# Patient Record
Sex: FEMALE | Race: ASIAN | NOT HISPANIC OR LATINO | URBAN - METROPOLITAN AREA
[De-identification: names, ages, dates, MRNs, and addresses within clinical notes are randomized per-mention and may not be internally consistent; named-entity substitution may affect disease eponyms.]

---

## 2023-10-11 ENCOUNTER — EMERGENCY (EMERGENCY)
Facility: HOSPITAL | Age: 68
LOS: 0 days | Discharge: HOME | End: 2023-10-11
Attending: EMERGENCY MEDICINE
Payer: COMMERCIAL

## 2023-10-11 VITALS
RESPIRATION RATE: 18 BRPM | WEIGHT: 156.09 LBS | DIASTOLIC BLOOD PRESSURE: 87 MMHG | SYSTOLIC BLOOD PRESSURE: 164 MMHG | HEART RATE: 60 BPM | OXYGEN SATURATION: 96 % | TEMPERATURE: 98 F

## 2023-10-11 DIAGNOSIS — R42 DIZZINESS AND GIDDINESS: ICD-10-CM

## 2023-10-11 DIAGNOSIS — S09.90XA UNSPECIFIED INJURY OF HEAD, INITIAL ENCOUNTER: ICD-10-CM

## 2023-10-11 DIAGNOSIS — I10 ESSENTIAL (PRIMARY) HYPERTENSION: ICD-10-CM
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
## 2023-10-11 DIAGNOSIS — R51.9 HEADACHE, UNSPECIFIED: ICD-10-CM

## 2023-10-11 PROCEDURE — 70450 CT HEAD/BRAIN W/O DYE: CPT | Mod: MA

## 2023-10-11 PROCEDURE — 99284 EMERGENCY DEPT VISIT MOD MDM: CPT | Mod: 25

## 2023-10-11 PROCEDURE — 70450 CT HEAD/BRAIN W/O DYE: CPT | Mod: 26,MA

## 2023-10-11 PROCEDURE — 99284 EMERGENCY DEPT VISIT MOD MDM: CPT

## 2023-10-11 RX ORDER — ACETAMINOPHEN 500 MG
650 TABLET ORAL ONCE
Refills: 0 | Status: COMPLETED | OUTPATIENT
Start: 2023-10-11 | End: 2023-10-11

## 2023-10-11 RX ADMIN — Medication 650 MILLIGRAM(S): at 20:18
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
## 2023-10-11 NOTE — ED PROVIDER NOTE - NS ED ATTENDING STATEMENT MOD
This was a shared visit with the COLBY. I reviewed and verified the documentation and independently performed the documented:
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
## 2023-10-11 NOTE — ED PROVIDER NOTE - NSFOLLOWUPCLINICS_GEN_ALL_ED_FT
[facility] Concussion Program  Concussion Program  [address] [place] [address]   Phone: [phone]  Fax:

## 2023-10-11 NOTE — ED ADULT NURSE NOTE - NSFALLUNIVINTERV_ED_ALL_ED
Bed/Stretcher in lowest position, wheels locked, appropriate side rails in place/Call bell, personal items and telephone in reach/Instruct patient to call for assistance before getting out of bed/chair/stretcher/Non-slip footwear applied when patient is off stretcher/[place] to call system/Physically safe environment - no spills, clutter or unnecessary equipment/Purposeful proactive rounding/Room/bathroom lighting operational, light cord in reach

## 2023-10-11 NOTE — ED PROVIDER NOTE - PHYSICAL EXAMINATION
CONSTITUTIONAL: Well-appearing; well-nourished; in no apparent distress.   EYES: PERRL; EOM intact.   ENT: normal nose; no rhinorrhea; normal pharynx with no tonsillar hypertrophy.   NECK: Supple; non-tender; no cervical lymphadenopathy.   CARDIOVASCULAR: Normal S1, S2; no murmurs, rubs, or gallops. Equal radial pulses  RESPIRATORY: Normal chest excursion with respiration; breath sounds clear and equal bilaterally; no wheezes, rhonchi, or rales.  GI/: Normal bowel sounds; non-distended; non-tender; no palpable organomegaly.   MS: No evidence of trauma or deformity. Normal ROM in all four extremities; non-tender to palpation; distal pulses are normal.   SKIN: Normal for age and race; warm; dry; good turgor; no apparent lesions or exudate.   NEURO/PSYCH: A & O x 4; grossly unremarkable. mood and manner are appropriate No focal deficits.  No facial droop.  No tongue deviation.  Cerebellar intact.  Normal gait.  Sensation intact.

## 2023-10-11 NOTE — ED PROVIDER NOTE - PATIENT PORTAL LINK FT
You can access the FollowMyHealth Patient Portal offered by [facility] by registering at the following website: [email or web address]. By joining [employer]’s FollowMyHealth portal, you will also be able to view your health information using other applications (apps) compatible with our system.
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
## 2023-11-08 ENCOUNTER — INPATIENT (INPATIENT)
Facility: HOSPITAL | Age: 68
LOS: 2 days | Discharge: HOME | DRG: 244 | End: 2023-11-11
Attending: INTERNAL MEDICINE | Admitting: STUDENT IN AN ORGANIZED HEALTH CARE EDUCATION/TRAINING PROGRAM
Payer: MEDICARE

## 2023-11-08 VITALS
DIASTOLIC BLOOD PRESSURE: 59 MMHG | OXYGEN SATURATION: 96 % | HEART RATE: 62 BPM | SYSTOLIC BLOOD PRESSURE: 118 MMHG | RESPIRATION RATE: 16 BRPM

## 2023-11-08 DIAGNOSIS — I95.9 HYPOTENSION, UNSPECIFIED: ICD-10-CM

## 2023-11-08 DIAGNOSIS — I49.5 SICK SINUS SYNDROME: ICD-10-CM

## 2023-11-08 DIAGNOSIS — I10 ESSENTIAL (PRIMARY) HYPERTENSION: ICD-10-CM

## 2023-11-08 DIAGNOSIS — K59.00 CONSTIPATION, UNSPECIFIED: ICD-10-CM

## 2023-11-08 DIAGNOSIS — E78.5 HYPERLIPIDEMIA, UNSPECIFIED: ICD-10-CM

## 2023-11-08 DIAGNOSIS — Z79.899 OTHER LONG TERM (CURRENT) DRUG THERAPY: ICD-10-CM

## 2023-11-08 DIAGNOSIS — I44.1 ATRIOVENTRICULAR BLOCK, SECOND DEGREE: ICD-10-CM

## 2023-11-08 DIAGNOSIS — R00.1 BRADYCARDIA, UNSPECIFIED: ICD-10-CM

## 2023-11-08 DIAGNOSIS — I49.8 OTHER SPECIFIED CARDIAC ARRHYTHMIAS: ICD-10-CM

## 2023-11-08 LAB
ALBUMIN SERPL ELPH-MCNC: 4.5 G/DL — SIGNIFICANT CHANGE UP (ref 3.5–5.2)
ALBUMIN SERPL ELPH-MCNC: 4.5 G/DL — SIGNIFICANT CHANGE UP (ref 3.5–5.2)
ALP SERPL-CCNC: 82 U/L — SIGNIFICANT CHANGE UP (ref 30–115)
ALP SERPL-CCNC: 82 U/L — SIGNIFICANT CHANGE UP (ref 30–115)
ALT FLD-CCNC: 27 U/L — SIGNIFICANT CHANGE UP (ref 0–41)
ALT FLD-CCNC: 27 U/L — SIGNIFICANT CHANGE UP (ref 0–41)
ANION GAP SERPL CALC-SCNC: 10 MMOL/L — SIGNIFICANT CHANGE UP (ref 7–14)
ANION GAP SERPL CALC-SCNC: 10 MMOL/L — SIGNIFICANT CHANGE UP (ref 7–14)
AST SERPL-CCNC: 33 U/L — SIGNIFICANT CHANGE UP (ref 0–41)
AST SERPL-CCNC: 33 U/L — SIGNIFICANT CHANGE UP (ref 0–41)
BASOPHILS # BLD AUTO: 0.07 K/UL — SIGNIFICANT CHANGE UP (ref 0–0.2)
BASOPHILS # BLD AUTO: 0.07 K/UL — SIGNIFICANT CHANGE UP (ref 0–0.2)
BASOPHILS NFR BLD AUTO: 0.9 % — SIGNIFICANT CHANGE UP (ref 0–1)
BASOPHILS NFR BLD AUTO: 0.9 % — SIGNIFICANT CHANGE UP (ref 0–1)
BILIRUB SERPL-MCNC: 0.6 MG/DL — SIGNIFICANT CHANGE UP (ref 0.2–1.2)
BILIRUB SERPL-MCNC: 0.6 MG/DL — SIGNIFICANT CHANGE UP (ref 0.2–1.2)
BUN SERPL-MCNC: 25 MG/DL — HIGH (ref 10–20)
BUN SERPL-MCNC: 25 MG/DL — HIGH (ref 10–20)
CALCIUM SERPL-MCNC: 9.8 MG/DL — SIGNIFICANT CHANGE UP (ref 8.4–10.5)
CALCIUM SERPL-MCNC: 9.8 MG/DL — SIGNIFICANT CHANGE UP (ref 8.4–10.5)
CHLORIDE SERPL-SCNC: 100 MMOL/L — SIGNIFICANT CHANGE UP (ref 98–110)
CHLORIDE SERPL-SCNC: 100 MMOL/L — SIGNIFICANT CHANGE UP (ref 98–110)
CO2 SERPL-SCNC: 29 MMOL/L — SIGNIFICANT CHANGE UP (ref 17–32)
CO2 SERPL-SCNC: 29 MMOL/L — SIGNIFICANT CHANGE UP (ref 17–32)
CREAT SERPL-MCNC: 0.8 MG/DL — SIGNIFICANT CHANGE UP (ref 0.7–1.5)
CREAT SERPL-MCNC: 0.8 MG/DL — SIGNIFICANT CHANGE UP (ref 0.7–1.5)
D DIMER BLD IA.RAPID-MCNC: 480 NG/ML DDU — HIGH
D DIMER BLD IA.RAPID-MCNC: 480 NG/ML DDU — HIGH
EGFR: 80 ML/MIN/1.73M2 — SIGNIFICANT CHANGE UP
EGFR: 80 ML/MIN/1.73M2 — SIGNIFICANT CHANGE UP
EOSINOPHIL # BLD AUTO: 0.15 K/UL — SIGNIFICANT CHANGE UP (ref 0–0.7)
EOSINOPHIL # BLD AUTO: 0.15 K/UL — SIGNIFICANT CHANGE UP (ref 0–0.7)
EOSINOPHIL NFR BLD AUTO: 1.9 % — SIGNIFICANT CHANGE UP (ref 0–8)
EOSINOPHIL NFR BLD AUTO: 1.9 % — SIGNIFICANT CHANGE UP (ref 0–8)
GLUCOSE SERPL-MCNC: 113 MG/DL — HIGH (ref 70–99)
GLUCOSE SERPL-MCNC: 113 MG/DL — HIGH (ref 70–99)
HCT VFR BLD CALC: 39.9 % — SIGNIFICANT CHANGE UP (ref 37–47)
HCT VFR BLD CALC: 39.9 % — SIGNIFICANT CHANGE UP (ref 37–47)
HGB BLD-MCNC: 13.3 G/DL — SIGNIFICANT CHANGE UP (ref 12–16)
HGB BLD-MCNC: 13.3 G/DL — SIGNIFICANT CHANGE UP (ref 12–16)
IMM GRANULOCYTES NFR BLD AUTO: 0.5 % — HIGH (ref 0.1–0.3)
IMM GRANULOCYTES NFR BLD AUTO: 0.5 % — HIGH (ref 0.1–0.3)
LYMPHOCYTES # BLD AUTO: 1.31 K/UL — SIGNIFICANT CHANGE UP (ref 1.2–3.4)
LYMPHOCYTES # BLD AUTO: 1.31 K/UL — SIGNIFICANT CHANGE UP (ref 1.2–3.4)
LYMPHOCYTES # BLD AUTO: 16.5 % — LOW (ref 20.5–51.1)
LYMPHOCYTES # BLD AUTO: 16.5 % — LOW (ref 20.5–51.1)
MAGNESIUM SERPL-MCNC: 2.4 MG/DL — SIGNIFICANT CHANGE UP (ref 1.8–2.4)
MAGNESIUM SERPL-MCNC: 2.4 MG/DL — SIGNIFICANT CHANGE UP (ref 1.8–2.4)
MCHC RBC-ENTMCNC: 30.6 PG — SIGNIFICANT CHANGE UP (ref 27–31)
MCHC RBC-ENTMCNC: 30.6 PG — SIGNIFICANT CHANGE UP (ref 27–31)
MCHC RBC-ENTMCNC: 33.3 G/DL — SIGNIFICANT CHANGE UP (ref 32–37)
MCHC RBC-ENTMCNC: 33.3 G/DL — SIGNIFICANT CHANGE UP (ref 32–37)
MCV RBC AUTO: 91.7 FL — SIGNIFICANT CHANGE UP (ref 81–99)
MCV RBC AUTO: 91.7 FL — SIGNIFICANT CHANGE UP (ref 81–99)
MONOCYTES # BLD AUTO: 0.53 K/UL — SIGNIFICANT CHANGE UP (ref 0.1–0.6)
MONOCYTES # BLD AUTO: 0.53 K/UL — SIGNIFICANT CHANGE UP (ref 0.1–0.6)
MONOCYTES NFR BLD AUTO: 6.7 % — SIGNIFICANT CHANGE UP (ref 1.7–9.3)
MONOCYTES NFR BLD AUTO: 6.7 % — SIGNIFICANT CHANGE UP (ref 1.7–9.3)
NEUTROPHILS # BLD AUTO: 5.83 K/UL — SIGNIFICANT CHANGE UP (ref 1.4–6.5)
NEUTROPHILS # BLD AUTO: 5.83 K/UL — SIGNIFICANT CHANGE UP (ref 1.4–6.5)
NEUTROPHILS NFR BLD AUTO: 73.5 % — SIGNIFICANT CHANGE UP (ref 42.2–75.2)
NEUTROPHILS NFR BLD AUTO: 73.5 % — SIGNIFICANT CHANGE UP (ref 42.2–75.2)
NRBC # BLD: 0 /100 WBCS — SIGNIFICANT CHANGE UP (ref 0–0)
NRBC # BLD: 0 /100 WBCS — SIGNIFICANT CHANGE UP (ref 0–0)
NT-PROBNP SERPL-SCNC: 64 PG/ML — SIGNIFICANT CHANGE UP (ref 0–300)
NT-PROBNP SERPL-SCNC: 64 PG/ML — SIGNIFICANT CHANGE UP (ref 0–300)
PLATELET # BLD AUTO: 189 K/UL — SIGNIFICANT CHANGE UP (ref 130–400)
PLATELET # BLD AUTO: 189 K/UL — SIGNIFICANT CHANGE UP (ref 130–400)
PMV BLD: 11.2 FL — HIGH (ref 7.4–10.4)
PMV BLD: 11.2 FL — HIGH (ref 7.4–10.4)
POTASSIUM SERPL-MCNC: 4.1 MMOL/L — SIGNIFICANT CHANGE UP (ref 3.5–5)
POTASSIUM SERPL-MCNC: 4.1 MMOL/L — SIGNIFICANT CHANGE UP (ref 3.5–5)
POTASSIUM SERPL-SCNC: 4.1 MMOL/L — SIGNIFICANT CHANGE UP (ref 3.5–5)
POTASSIUM SERPL-SCNC: 4.1 MMOL/L — SIGNIFICANT CHANGE UP (ref 3.5–5)
PROT SERPL-MCNC: 7.6 G/DL — SIGNIFICANT CHANGE UP (ref 6–8)
PROT SERPL-MCNC: 7.6 G/DL — SIGNIFICANT CHANGE UP (ref 6–8)
RBC # BLD: 4.35 M/UL — SIGNIFICANT CHANGE UP (ref 4.2–5.4)
RBC # BLD: 4.35 M/UL — SIGNIFICANT CHANGE UP (ref 4.2–5.4)
RBC # FLD: 12.3 % — SIGNIFICANT CHANGE UP (ref 11.5–14.5)
RBC # FLD: 12.3 % — SIGNIFICANT CHANGE UP (ref 11.5–14.5)
SODIUM SERPL-SCNC: 139 MMOL/L — SIGNIFICANT CHANGE UP (ref 135–146)
SODIUM SERPL-SCNC: 139 MMOL/L — SIGNIFICANT CHANGE UP (ref 135–146)
TROPONIN T SERPL-MCNC: <0.01 NG/ML — SIGNIFICANT CHANGE UP
TROPONIN T SERPL-MCNC: <0.01 NG/ML — SIGNIFICANT CHANGE UP
WBC # BLD: 7.93 K/UL — SIGNIFICANT CHANGE UP (ref 4.8–10.8)
WBC # BLD: 7.93 K/UL — SIGNIFICANT CHANGE UP (ref 4.8–10.8)
WBC # FLD AUTO: 7.93 K/UL — SIGNIFICANT CHANGE UP (ref 4.8–10.8)
WBC # FLD AUTO: 7.93 K/UL — SIGNIFICANT CHANGE UP (ref 4.8–10.8)

## 2023-11-08 PROCEDURE — 99285 EMERGENCY DEPT VISIT HI MDM: CPT

## 2023-11-08 PROCEDURE — 84443 ASSAY THYROID STIM HORMONE: CPT

## 2023-11-08 PROCEDURE — 71046 X-RAY EXAM CHEST 2 VIEWS: CPT

## 2023-11-08 PROCEDURE — 74018 RADEX ABDOMEN 1 VIEW: CPT

## 2023-11-08 PROCEDURE — 86803 HEPATITIS C AB TEST: CPT

## 2023-11-08 PROCEDURE — 86618 LYME DISEASE ANTIBODY: CPT

## 2023-11-08 PROCEDURE — 83605 ASSAY OF LACTIC ACID: CPT

## 2023-11-08 PROCEDURE — 80053 COMPREHEN METABOLIC PANEL: CPT

## 2023-11-08 PROCEDURE — 83036 HEMOGLOBIN GLYCOSYLATED A1C: CPT

## 2023-11-08 PROCEDURE — 86901 BLOOD TYPING SEROLOGIC RH(D): CPT

## 2023-11-08 PROCEDURE — 86850 RBC ANTIBODY SCREEN: CPT

## 2023-11-08 PROCEDURE — 36415 COLL VENOUS BLD VENIPUNCTURE: CPT

## 2023-11-08 PROCEDURE — 93005 ELECTROCARDIOGRAM TRACING: CPT

## 2023-11-08 PROCEDURE — 85025 COMPLETE CBC W/AUTO DIFF WBC: CPT

## 2023-11-08 PROCEDURE — 85730 THROMBOPLASTIN TIME PARTIAL: CPT

## 2023-11-08 PROCEDURE — 71045 X-RAY EXAM CHEST 1 VIEW: CPT

## 2023-11-08 PROCEDURE — 85027 COMPLETE CBC AUTOMATED: CPT

## 2023-11-08 PROCEDURE — 80061 LIPID PANEL: CPT

## 2023-11-08 PROCEDURE — 71275 CT ANGIOGRAPHY CHEST: CPT | Mod: MA

## 2023-11-08 PROCEDURE — 71045 X-RAY EXAM CHEST 1 VIEW: CPT | Mod: 26

## 2023-11-08 PROCEDURE — 86900 BLOOD TYPING SEROLOGIC ABO: CPT

## 2023-11-08 PROCEDURE — 93280 PM DEVICE PROGR EVAL DUAL: CPT

## 2023-11-08 PROCEDURE — C1785: CPT

## 2023-11-08 PROCEDURE — 93306 TTE W/DOPPLER COMPLETE: CPT

## 2023-11-08 PROCEDURE — 85610 PROTHROMBIN TIME: CPT

## 2023-11-08 PROCEDURE — 84100 ASSAY OF PHOSPHORUS: CPT

## 2023-11-08 PROCEDURE — 83735 ASSAY OF MAGNESIUM: CPT

## 2023-11-08 PROCEDURE — C1898: CPT

## 2023-11-08 RX ORDER — SODIUM CHLORIDE 9 MG/ML
1000 INJECTION INTRAMUSCULAR; INTRAVENOUS; SUBCUTANEOUS ONCE
Refills: 0 | Status: COMPLETED | OUTPATIENT
Start: 2023-11-08 | End: 2023-11-08

## 2023-11-08 RX ORDER — ONDANSETRON 8 MG/1
4 TABLET, FILM COATED ORAL ONCE
Refills: 0 | Status: COMPLETED | OUTPATIENT
Start: 2023-11-08 | End: 2023-11-08

## 2023-11-08 RX ADMIN — SODIUM CHLORIDE 1000 MILLILITER(S): 9 INJECTION INTRAMUSCULAR; INTRAVENOUS; SUBCUTANEOUS at 21:52

## 2023-11-08 RX ADMIN — ONDANSETRON 4 MILLIGRAM(S): 8 TABLET, FILM COATED ORAL at 21:51

## 2023-11-08 NOTE — ED PROVIDER NOTE - CLINICAL SUMMARY MEDICAL DECISION MAKING FREE TEXT BOX
69 yo f with no known medical history BIBEMS due to chest pain, nausea and vomiting, per EMS report upon arrival found pt has HR:36, given 1mg atropin HR improved to 60s but remained in chest pain, brought in here. here pt still endorses chest pain and nausea, denies sob, cough, fever chills. denies taking any medications here HR is 60s  labs negative trop, d-dimer elevated but per years criteria negative, 2 times ecg nsr.  due to unexplained symptomatic bradycardia pt admitted to medicine telemetric bed.

## 2023-11-08 NOTE — ED PROVIDER NOTE - PHYSICAL EXAMINATION
VITAL SIGNS: I have reviewed nursing notes and confirm.  CONSTITUTIONAL: non-toxic, well appearing  SKIN: no rash, no petechiae.  EYES: PERRL, pink conjunctiva, anicteric  ENT: tongue midline, no exudates, MMM  NECK: Supple; no meningismus  CARD: RRR, no murmurs, equal radial pulses bilaterally 2+  RESP: CTAB, no respiratory distress  ABD: Soft, non-tender  EXT: Normal ROM x4. No edema.   NEURO: Alert, oriented, no focal deficits

## 2023-11-08 NOTE — ED PROVIDER NOTE - ATTENDING CONTRIBUTION TO CARE
I have personally performed a history and physical exam on this patient and personally directed the management of the patient.  69 yo f with no known medical history BIBEMS due to chest pain, nausea and vomitting, per EMS report uppon arrival found pt has HR:36, given 1mg atropin HR improved to 60s but remained in chest pain, bourght in here. here pt still endorses chest pain and nausea, denies sob, cough, fever chills. denies taking any meditions. here HR is 60s  CON:  no acute distress, HENMT: normocephalic, atraumatic, anicteric, no conjunctival injection,  CV: regular rhythm, distal pulses intact, RESP: no acute respiratory distress, no stridor, breathing comfortably on RA , GI:  soft, nontender, no rebound, no guarding, SKIN: no wounds MSK: no deformities, NEURO: no gross motor or sensory deficit Psychiatric: appropriate mood, appropriate affect  palaced on cardiac monitoring and pacer pads  will send cardiac labs, cxr, ecg and reevaluate

## 2023-11-08 NOTE — ED ADULT TRIAGE NOTE - CHIEF COMPLAINT QUOTE
Pt BIBA from home, family called for chest pain with 1x vomiting  EMS endorses bradycardia to 36 on their arrival - 1mg atropine admin enroute with improvement of HR to 60s

## 2023-11-08 NOTE — ED PROVIDER NOTE - OBJECTIVE STATEMENT
68-year-old female with past medical history of hypertension hyperlipidemia presents to the ED with chest pain and shortness of breath.  Per family patient was doing well earlier today and at baseline, ate dinner, and shortly afterwards developed chest pain associated with nausea and 2 episodes of nb/nb vomiting. Family became concerned and EMS was called.  EMS reports patient was bradycardic approximately 38 bpm with hypotension 80s/50s.  EMS administered atropine 1 mg with improvement in heart rate to 60s bpm, and was given IV fluids en route with improvement in blood pressure as well.  Denies recent fevers illness chills shortness of breath cough abdominal pain or lower extremity edema.

## 2023-11-09 LAB
A1C WITH ESTIMATED AVERAGE GLUCOSE RESULT: 5.3 % — SIGNIFICANT CHANGE UP (ref 4–5.6)
A1C WITH ESTIMATED AVERAGE GLUCOSE RESULT: 5.3 % — SIGNIFICANT CHANGE UP (ref 4–5.6)
ALBUMIN SERPL ELPH-MCNC: 4.1 G/DL — SIGNIFICANT CHANGE UP (ref 3.5–5.2)
ALBUMIN SERPL ELPH-MCNC: 4.1 G/DL — SIGNIFICANT CHANGE UP (ref 3.5–5.2)
ALP SERPL-CCNC: 76 U/L — SIGNIFICANT CHANGE UP (ref 30–115)
ALP SERPL-CCNC: 76 U/L — SIGNIFICANT CHANGE UP (ref 30–115)
ALT FLD-CCNC: 22 U/L — SIGNIFICANT CHANGE UP (ref 0–41)
ALT FLD-CCNC: 22 U/L — SIGNIFICANT CHANGE UP (ref 0–41)
ANION GAP SERPL CALC-SCNC: 8 MMOL/L — SIGNIFICANT CHANGE UP (ref 7–14)
ANION GAP SERPL CALC-SCNC: 8 MMOL/L — SIGNIFICANT CHANGE UP (ref 7–14)
APTT BLD: 28.1 SEC — SIGNIFICANT CHANGE UP (ref 27–39.2)
APTT BLD: 28.1 SEC — SIGNIFICANT CHANGE UP (ref 27–39.2)
AST SERPL-CCNC: 22 U/L — SIGNIFICANT CHANGE UP (ref 0–41)
AST SERPL-CCNC: 22 U/L — SIGNIFICANT CHANGE UP (ref 0–41)
BASOPHILS # BLD AUTO: 0.06 K/UL — SIGNIFICANT CHANGE UP (ref 0–0.2)
BASOPHILS # BLD AUTO: 0.06 K/UL — SIGNIFICANT CHANGE UP (ref 0–0.2)
BASOPHILS NFR BLD AUTO: 1 % — SIGNIFICANT CHANGE UP (ref 0–1)
BASOPHILS NFR BLD AUTO: 1 % — SIGNIFICANT CHANGE UP (ref 0–1)
BILIRUB SERPL-MCNC: 0.5 MG/DL — SIGNIFICANT CHANGE UP (ref 0.2–1.2)
BILIRUB SERPL-MCNC: 0.5 MG/DL — SIGNIFICANT CHANGE UP (ref 0.2–1.2)
BLD GP AB SCN SERPL QL: SIGNIFICANT CHANGE UP
BLD GP AB SCN SERPL QL: SIGNIFICANT CHANGE UP
BUN SERPL-MCNC: 19 MG/DL — SIGNIFICANT CHANGE UP (ref 10–20)
BUN SERPL-MCNC: 19 MG/DL — SIGNIFICANT CHANGE UP (ref 10–20)
CALCIUM SERPL-MCNC: 9.2 MG/DL — SIGNIFICANT CHANGE UP (ref 8.4–10.5)
CALCIUM SERPL-MCNC: 9.2 MG/DL — SIGNIFICANT CHANGE UP (ref 8.4–10.5)
CHLORIDE SERPL-SCNC: 107 MMOL/L — SIGNIFICANT CHANGE UP (ref 98–110)
CHLORIDE SERPL-SCNC: 107 MMOL/L — SIGNIFICANT CHANGE UP (ref 98–110)
CHOLEST SERPL-MCNC: 254 MG/DL — HIGH
CHOLEST SERPL-MCNC: 254 MG/DL — HIGH
CO2 SERPL-SCNC: 27 MMOL/L — SIGNIFICANT CHANGE UP (ref 17–32)
CO2 SERPL-SCNC: 27 MMOL/L — SIGNIFICANT CHANGE UP (ref 17–32)
CREAT SERPL-MCNC: 0.7 MG/DL — SIGNIFICANT CHANGE UP (ref 0.7–1.5)
CREAT SERPL-MCNC: 0.7 MG/DL — SIGNIFICANT CHANGE UP (ref 0.7–1.5)
EGFR: 94 ML/MIN/1.73M2 — SIGNIFICANT CHANGE UP
EGFR: 94 ML/MIN/1.73M2 — SIGNIFICANT CHANGE UP
EOSINOPHIL # BLD AUTO: 0.14 K/UL — SIGNIFICANT CHANGE UP (ref 0–0.7)
EOSINOPHIL # BLD AUTO: 0.14 K/UL — SIGNIFICANT CHANGE UP (ref 0–0.7)
EOSINOPHIL NFR BLD AUTO: 2.4 % — SIGNIFICANT CHANGE UP (ref 0–8)
EOSINOPHIL NFR BLD AUTO: 2.4 % — SIGNIFICANT CHANGE UP (ref 0–8)
ESTIMATED AVERAGE GLUCOSE: 105 MG/DL — SIGNIFICANT CHANGE UP (ref 68–114)
ESTIMATED AVERAGE GLUCOSE: 105 MG/DL — SIGNIFICANT CHANGE UP (ref 68–114)
GLUCOSE SERPL-MCNC: 116 MG/DL — HIGH (ref 70–99)
GLUCOSE SERPL-MCNC: 116 MG/DL — HIGH (ref 70–99)
HCT VFR BLD CALC: 37.3 % — SIGNIFICANT CHANGE UP (ref 37–47)
HCT VFR BLD CALC: 37.3 % — SIGNIFICANT CHANGE UP (ref 37–47)
HCV AB S/CO SERPL IA: 0.06 COI — SIGNIFICANT CHANGE UP
HCV AB S/CO SERPL IA: 0.06 COI — SIGNIFICANT CHANGE UP
HCV AB SERPL-IMP: SIGNIFICANT CHANGE UP
HCV AB SERPL-IMP: SIGNIFICANT CHANGE UP
HDLC SERPL-MCNC: 63 MG/DL — SIGNIFICANT CHANGE UP
HDLC SERPL-MCNC: 63 MG/DL — SIGNIFICANT CHANGE UP
HGB BLD-MCNC: 12.5 G/DL — SIGNIFICANT CHANGE UP (ref 12–16)
HGB BLD-MCNC: 12.5 G/DL — SIGNIFICANT CHANGE UP (ref 12–16)
IMM GRANULOCYTES NFR BLD AUTO: 0.3 % — SIGNIFICANT CHANGE UP (ref 0.1–0.3)
IMM GRANULOCYTES NFR BLD AUTO: 0.3 % — SIGNIFICANT CHANGE UP (ref 0.1–0.3)
INR BLD: 0.98 RATIO — SIGNIFICANT CHANGE UP (ref 0.65–1.3)
INR BLD: 0.98 RATIO — SIGNIFICANT CHANGE UP (ref 0.65–1.3)
LIPID PNL WITH DIRECT LDL SERPL: 172 MG/DL — HIGH
LIPID PNL WITH DIRECT LDL SERPL: 172 MG/DL — HIGH
LYMPHOCYTES # BLD AUTO: 1.45 K/UL — SIGNIFICANT CHANGE UP (ref 1.2–3.4)
LYMPHOCYTES # BLD AUTO: 1.45 K/UL — SIGNIFICANT CHANGE UP (ref 1.2–3.4)
LYMPHOCYTES # BLD AUTO: 24.6 % — SIGNIFICANT CHANGE UP (ref 20.5–51.1)
LYMPHOCYTES # BLD AUTO: 24.6 % — SIGNIFICANT CHANGE UP (ref 20.5–51.1)
MAGNESIUM SERPL-MCNC: 2.2 MG/DL — SIGNIFICANT CHANGE UP (ref 1.8–2.4)
MAGNESIUM SERPL-MCNC: 2.2 MG/DL — SIGNIFICANT CHANGE UP (ref 1.8–2.4)
MCHC RBC-ENTMCNC: 30.9 PG — SIGNIFICANT CHANGE UP (ref 27–31)
MCHC RBC-ENTMCNC: 30.9 PG — SIGNIFICANT CHANGE UP (ref 27–31)
MCHC RBC-ENTMCNC: 33.5 G/DL — SIGNIFICANT CHANGE UP (ref 32–37)
MCHC RBC-ENTMCNC: 33.5 G/DL — SIGNIFICANT CHANGE UP (ref 32–37)
MCV RBC AUTO: 92.3 FL — SIGNIFICANT CHANGE UP (ref 81–99)
MCV RBC AUTO: 92.3 FL — SIGNIFICANT CHANGE UP (ref 81–99)
MONOCYTES # BLD AUTO: 0.49 K/UL — SIGNIFICANT CHANGE UP (ref 0.1–0.6)
MONOCYTES # BLD AUTO: 0.49 K/UL — SIGNIFICANT CHANGE UP (ref 0.1–0.6)
MONOCYTES NFR BLD AUTO: 8.3 % — SIGNIFICANT CHANGE UP (ref 1.7–9.3)
MONOCYTES NFR BLD AUTO: 8.3 % — SIGNIFICANT CHANGE UP (ref 1.7–9.3)
NEUTROPHILS # BLD AUTO: 3.74 K/UL — SIGNIFICANT CHANGE UP (ref 1.4–6.5)
NEUTROPHILS # BLD AUTO: 3.74 K/UL — SIGNIFICANT CHANGE UP (ref 1.4–6.5)
NEUTROPHILS NFR BLD AUTO: 63.4 % — SIGNIFICANT CHANGE UP (ref 42.2–75.2)
NEUTROPHILS NFR BLD AUTO: 63.4 % — SIGNIFICANT CHANGE UP (ref 42.2–75.2)
NON HDL CHOLESTEROL: 191 MG/DL — HIGH
NON HDL CHOLESTEROL: 191 MG/DL — HIGH
NRBC # BLD: 0 /100 WBCS — SIGNIFICANT CHANGE UP (ref 0–0)
NRBC # BLD: 0 /100 WBCS — SIGNIFICANT CHANGE UP (ref 0–0)
PHOSPHATE SERPL-MCNC: 3.6 MG/DL — SIGNIFICANT CHANGE UP (ref 2.1–4.9)
PHOSPHATE SERPL-MCNC: 3.6 MG/DL — SIGNIFICANT CHANGE UP (ref 2.1–4.9)
PLATELET # BLD AUTO: 170 K/UL — SIGNIFICANT CHANGE UP (ref 130–400)
PLATELET # BLD AUTO: 170 K/UL — SIGNIFICANT CHANGE UP (ref 130–400)
PMV BLD: 11.4 FL — HIGH (ref 7.4–10.4)
PMV BLD: 11.4 FL — HIGH (ref 7.4–10.4)
POTASSIUM SERPL-MCNC: 4.1 MMOL/L — SIGNIFICANT CHANGE UP (ref 3.5–5)
POTASSIUM SERPL-MCNC: 4.1 MMOL/L — SIGNIFICANT CHANGE UP (ref 3.5–5)
POTASSIUM SERPL-SCNC: 4.1 MMOL/L — SIGNIFICANT CHANGE UP (ref 3.5–5)
POTASSIUM SERPL-SCNC: 4.1 MMOL/L — SIGNIFICANT CHANGE UP (ref 3.5–5)
PROT SERPL-MCNC: 6.8 G/DL — SIGNIFICANT CHANGE UP (ref 6–8)
PROT SERPL-MCNC: 6.8 G/DL — SIGNIFICANT CHANGE UP (ref 6–8)
PROTHROM AB SERPL-ACNC: 11.2 SEC — SIGNIFICANT CHANGE UP (ref 9.95–12.87)
PROTHROM AB SERPL-ACNC: 11.2 SEC — SIGNIFICANT CHANGE UP (ref 9.95–12.87)
RBC # BLD: 4.04 M/UL — LOW (ref 4.2–5.4)
RBC # BLD: 4.04 M/UL — LOW (ref 4.2–5.4)
RBC # FLD: 12.6 % — SIGNIFICANT CHANGE UP (ref 11.5–14.5)
RBC # FLD: 12.6 % — SIGNIFICANT CHANGE UP (ref 11.5–14.5)
SODIUM SERPL-SCNC: 142 MMOL/L — SIGNIFICANT CHANGE UP (ref 135–146)
SODIUM SERPL-SCNC: 142 MMOL/L — SIGNIFICANT CHANGE UP (ref 135–146)
T4 FREE+ TSH PNL SERPL: 1.33 UIU/ML — SIGNIFICANT CHANGE UP (ref 0.27–4.2)
T4 FREE+ TSH PNL SERPL: 1.33 UIU/ML — SIGNIFICANT CHANGE UP (ref 0.27–4.2)
TRIGL SERPL-MCNC: 95 MG/DL — SIGNIFICANT CHANGE UP
TRIGL SERPL-MCNC: 95 MG/DL — SIGNIFICANT CHANGE UP
WBC # BLD: 5.9 K/UL — SIGNIFICANT CHANGE UP (ref 4.8–10.8)
WBC # BLD: 5.9 K/UL — SIGNIFICANT CHANGE UP (ref 4.8–10.8)
WBC # FLD AUTO: 5.9 K/UL — SIGNIFICANT CHANGE UP (ref 4.8–10.8)
WBC # FLD AUTO: 5.9 K/UL — SIGNIFICANT CHANGE UP (ref 4.8–10.8)

## 2023-11-09 PROCEDURE — 99291 CRITICAL CARE FIRST HOUR: CPT

## 2023-11-09 PROCEDURE — 71275 CT ANGIOGRAPHY CHEST: CPT | Mod: 26

## 2023-11-09 PROCEDURE — 93306 TTE W/DOPPLER COMPLETE: CPT | Mod: 26

## 2023-11-09 PROCEDURE — 99222 1ST HOSP IP/OBS MODERATE 55: CPT

## 2023-11-09 PROCEDURE — 74018 RADEX ABDOMEN 1 VIEW: CPT | Mod: 26

## 2023-11-09 PROCEDURE — 99223 1ST HOSP IP/OBS HIGH 75: CPT | Mod: 57

## 2023-11-09 PROCEDURE — 93010 ELECTROCARDIOGRAM REPORT: CPT

## 2023-11-09 PROCEDURE — 93010 ELECTROCARDIOGRAM REPORT: CPT | Mod: 77

## 2023-11-09 RX ORDER — SENNA PLUS 8.6 MG/1
1 TABLET ORAL ONCE
Refills: 0 | Status: COMPLETED | OUTPATIENT
Start: 2023-11-09 | End: 2023-11-09

## 2023-11-09 RX ORDER — ATROPINE SULFATE 0.1 MG/ML
1 SYRINGE (ML) INJECTION ONCE
Refills: 0 | Status: DISCONTINUED | OUTPATIENT
Start: 2023-11-09 | End: 2023-11-09

## 2023-11-09 RX ORDER — LANOLIN ALCOHOL/MO/W.PET/CERES
3 CREAM (GRAM) TOPICAL AT BEDTIME
Refills: 0 | Status: DISCONTINUED | OUTPATIENT
Start: 2023-11-09 | End: 2023-11-10

## 2023-11-09 RX ORDER — DOPAMINE HYDROCHLORIDE 40 MG/ML
0.5 INJECTION, SOLUTION, CONCENTRATE INTRAVENOUS
Qty: 400 | Refills: 0 | Status: DISCONTINUED | OUTPATIENT
Start: 2023-11-09 | End: 2023-11-09

## 2023-11-09 RX ORDER — ENOXAPARIN SODIUM 100 MG/ML
40 INJECTION SUBCUTANEOUS EVERY 24 HOURS
Refills: 0 | Status: DISCONTINUED | OUTPATIENT
Start: 2023-11-09 | End: 2023-11-09

## 2023-11-09 RX ORDER — ATORVASTATIN CALCIUM 80 MG/1
20 TABLET, FILM COATED ORAL AT BEDTIME
Refills: 0 | Status: DISCONTINUED | OUTPATIENT
Start: 2023-11-09 | End: 2023-11-10

## 2023-11-09 RX ORDER — ACETAMINOPHEN 500 MG
650 TABLET ORAL EVERY 6 HOURS
Refills: 0 | Status: DISCONTINUED | OUTPATIENT
Start: 2023-11-09 | End: 2023-11-10

## 2023-11-09 RX ORDER — CHLORHEXIDINE GLUCONATE 213 G/1000ML
1 SOLUTION TOPICAL
Refills: 0 | Status: DISCONTINUED | OUTPATIENT
Start: 2023-11-09 | End: 2023-11-10

## 2023-11-09 RX ORDER — DOPAMINE HYDROCHLORIDE 40 MG/ML
10 INJECTION, SOLUTION, CONCENTRATE INTRAVENOUS
Qty: 400 | Refills: 0 | Status: DISCONTINUED | OUTPATIENT
Start: 2023-11-09 | End: 2023-11-10

## 2023-11-09 RX ORDER — ATROPINE SULFATE 0.1 MG/ML
0.5 SYRINGE (ML) INJECTION ONCE
Refills: 0 | Status: COMPLETED | OUTPATIENT
Start: 2023-11-09 | End: 2023-11-09

## 2023-11-09 RX ORDER — ONDANSETRON 8 MG/1
4 TABLET, FILM COATED ORAL EVERY 8 HOURS
Refills: 0 | Status: DISCONTINUED | OUTPATIENT
Start: 2023-11-09 | End: 2023-11-10

## 2023-11-09 RX ORDER — LOSARTAN/HYDROCHLOROTHIAZIDE 100MG-25MG
1 TABLET ORAL
Refills: 0 | DISCHARGE

## 2023-11-09 RX ORDER — POLYETHYLENE GLYCOL 3350 17 G/17G
17 POWDER, FOR SOLUTION ORAL ONCE
Refills: 0 | Status: COMPLETED | OUTPATIENT
Start: 2023-11-09 | End: 2023-11-09

## 2023-11-09 RX ORDER — AMLODIPINE BESYLATE 2.5 MG/1
1 TABLET ORAL
Refills: 0 | DISCHARGE

## 2023-11-09 RX ORDER — POLYETHYLENE GLYCOL 3350 17 G/17G
17 POWDER, FOR SOLUTION ORAL
Refills: 0 | Status: DISCONTINUED | OUTPATIENT
Start: 2023-11-10 | End: 2023-11-10

## 2023-11-09 RX ADMIN — DOPAMINE HYDROCHLORIDE 26.4 MICROGRAM(S)/KG/MIN: 40 INJECTION, SOLUTION, CONCENTRATE INTRAVENOUS at 12:11

## 2023-11-09 RX ADMIN — DOPAMINE HYDROCHLORIDE 1.33 MICROGRAM(S)/KG/MIN: 40 INJECTION, SOLUTION, CONCENTRATE INTRAVENOUS at 07:35

## 2023-11-09 RX ADMIN — SENNA PLUS 1 TABLET(S): 8.6 TABLET ORAL at 17:30

## 2023-11-09 RX ADMIN — DOPAMINE HYDROCHLORIDE 1.33 MICROGRAM(S)/KG/MIN: 40 INJECTION, SOLUTION, CONCENTRATE INTRAVENOUS at 07:21

## 2023-11-09 RX ADMIN — ATORVASTATIN CALCIUM 20 MILLIGRAM(S): 80 TABLET, FILM COATED ORAL at 21:21

## 2023-11-09 RX ADMIN — Medication 0.5 MILLIGRAM(S): at 12:11

## 2023-11-09 RX ADMIN — DOPAMINE HYDROCHLORIDE 26.4 MICROGRAM(S)/KG/MIN: 40 INJECTION, SOLUTION, CONCENTRATE INTRAVENOUS at 17:28

## 2023-11-09 RX ADMIN — ONDANSETRON 4 MILLIGRAM(S): 8 TABLET, FILM COATED ORAL at 07:59

## 2023-11-09 NOTE — CONSULT NOTE ADULT - SUBJECTIVE AND OBJECTIVE BOX
Outpt cardiologist: None    HPI:   68-year-old female with past medical history of hypertension hyperlipidemia presents to the ED with chest pain and shortness of breath.  Per family patient was doing well earlier today and at baseline, ate dinner, and shortly afterwards developed chest pain associated with nausea and 2 episodes of nb/nb vomiting. Family became concerned and EMS was called.  EMS reports patient was bradycardic approximately 38 bpm with hypotension 80s/50s.  EMS administered atropine 1 mg with improvement in heart rate to 60s bpm, and was given IV fluids en route with improvement in blood pressure as well. Per the son the patient had a similar episode few weeks ago but didn't seek medical care   Denies recent fevers illness chills shortness of breath cough abdominal pain or lower extremity edema.    In the ED , BP and HR 60 with ECG showing NSR  Labs unremarkable, D-dimer 480 , Trop 0.01  CXR with no consolidations    Patient admitted for bradycardic episode   (2023 02:02)      PAST MEDICAL & SURGICAL HISTORY  Hypertension  Hyperlipidemia        FAMILY HISTORY:  FAMILY HISTORY:      SOCIAL HISTORY:  Social History: neg x3      ALLERGIES:  No Known Allergies      MEDICATIONS:  atropine Syringe 1 milliGRAM(s) IV Push once  DOPamine Infusion 0.5 MICROgram(s)/kG/Min (1.33 mL/Hr) IV Continuous <Continuous>  enoxaparin Injectable 40 milliGRAM(s) SubCutaneous every 24 hours    PRN:  acetaminophen     Tablet .. 650 milliGRAM(s) Oral every 6 hours PRN  aluminum hydroxide/magnesium hydroxide/simethicone Suspension 30 milliLiter(s) Oral every 4 hours PRN  melatonin 3 milliGRAM(s) Oral at bedtime PRN  ondansetron Injectable 4 milliGRAM(s) IV Push every 8 hours PRN      HOME MEDICATIONS:  Home Medications:      VITALS:   T(F): 96.5 ( @ 23:45), Max: 96.5 ( @ 23:45)  HR: 36 ( @ 06:38) (36 - 62)  BP: 98/55 ( @ 06:38) (95/56 - 118/59)  BP(mean): --  RR: 17 ( @ 02:29) (16 - 17)  SpO2: 96% ( @ 02:29) (96% - 96%)    I&O's Summary      REVIEW OF SYSTEMS:  CONSTITUTIONAL: No weakness, fevers or chills  HEENT: No visual changes, neck/ear pain  RESPIRATORY: No cough, sob  CARDIOVASCULAR: See HPI  GASTROINTESTINAL: No abdominal pain. No nausea, vomiting, diarrhea   GENITOURINARY: No dysuria, frequency or hematuria  NEUROLOGICAL: No new focal deficits  SKIN: No new rashes    PHYSICAL EXAM:  General: Not in distress.  Non-toxic appearing.   HEENT: EOMI  Cardio: regular, S1, S2, no murmur, bradycardia   Pulm: B/L BS.  No wheezing / crackles / rales  Abdomen: Soft, non-tender, non-distended. Normoactive bowel sounds  Extremities: No edema b/l le  Neuro: A&O x3. No focal deficits    LABS:                        13.3   7.93  )-----------( 189      ( 2023 22:10 )             39.9         139  |  100  |  25<H>  ----------------------------<  113<H>  4.1   |  29  |  0.8    Ca    9.8      2023 22:10  Mg     2.4         TPro  7.6  /  Alb  4.5  /  TBili  0.6  /  DBili  x   /  AST  33  /  ALT  27  /  AlkPhos  82        Troponin T, Serum: <0.01 ng/mL (23 @ 22:10)    CARDIAC MARKERS ( 2023 22:10 )  x     / <0.01 ng/mL / x     / x     / x          RADIOLOGY:  CT Angio Chest PE Protocol w/ IV Cont (23): No evidence of acute intrathoracic pathology or pulmonary embolism.      EC Lead ECG:   Ventricular Rate 64 BPM    Atrial Rate 64 BPM    P-R Interval 160 ms    QRS Duration 94 ms    Q-T Interval 434 ms    QTC Calculation(Bazett) 447 ms    P Axis 35 degrees    R Axis 54 degrees    T Axis 49 degrees    Diagnosis Line Normal sinus rhythm  Normal ECG    Confirmed by SARA JARRETT, [facility] ([id number]) on 2023 10:37:12 PM ( @ 21:01) Outpt cardiologist: None    HPI:   68-year-old female with past medical history of hypertension hyperlipidemia presents to the ED with chest pain and shortness of breath.  Per family patient was doing well earlier today and at baseline, ate dinner, and shortly afterwards developed chest pain associated with nausea and 2 episodes of nb/nb vomiting. Family became concerned and EMS was called.  EMS reports patient was bradycardic approximately 38 bpm with hypotension 80s/50s.  EMS administered atropine 1 mg with improvement in heart rate to 60s bpm, and was given IV fluids en route with improvement in blood pressure as well. Per the son the patient had a similar episode few weeks ago but didn't seek medical care   Denies recent fevers illness chills shortness of breath cough abdominal pain or lower extremity edema.    In the ED , BP and HR 60 with ECG showing NSR  Labs unremarkable, D-dimer 480 , Trop 0.01  CXR with no consolidations    Patient admitted for bradycardic episode   (2023 02:02)      PAST MEDICAL & SURGICAL HISTORY  Hypertension  Hyperlipidemia        FAMILY HISTORY: no premature history of SCD      SOCIAL HISTORY:  Social History: neg x3      ALLERGIES:  No Known Allergies      MEDICATIONS:  atropine Syringe 1 milliGRAM(s) IV Push once  DOPamine Infusion 0.5 MICROgram(s)/kG/Min (1.33 mL/Hr) IV Continuous <Continuous>  enoxaparin Injectable 40 milliGRAM(s) SubCutaneous every 24 hours    PRN:  acetaminophen     Tablet .. 650 milliGRAM(s) Oral every 6 hours PRN  aluminum hydroxide/magnesium hydroxide/simethicone Suspension 30 milliLiter(s) Oral every 4 hours PRN  melatonin 3 milliGRAM(s) Oral at bedtime PRN  ondansetron Injectable 4 milliGRAM(s) IV Push every 8 hours PRN      HOME MEDICATIONS:  Home Medications:      VITALS:   T(F): 96.5 ( @ 23:45), Max: 96.5 ( @ 23:45)  HR: 36 ( @ 06:38) (36 - 62)  BP: 98/55 ( @ 06:38) (95/56 - 118/59)  BP(mean): --  RR: 17 ( @ 02:29) (16 - 17)  SpO2: 96% ( @ 02:29) (96% - 96%)    I&O's Summary      REVIEW OF SYSTEMS:  CONSTITUTIONAL: No weakness, fevers or chills  HEENT: No visual changes, neck/ear pain  RESPIRATORY: No cough, sob  CARDIOVASCULAR: See HPI  GASTROINTESTINAL: No abdominal pain. No nausea, vomiting, diarrhea   GENITOURINARY: No dysuria, frequency or hematuria  NEUROLOGICAL: No new focal deficits  SKIN: No new rashes    PHYSICAL EXAM:  General: Not in distress.  Non-toxic appearing.   HEENT: EOMI  Cardio: regular, S1, S2, no murmur, bradycardia   Pulm: B/L BS.  No wheezing / crackles / rales  Abdomen: Soft, non-tender, non-distended. Normoactive bowel sounds  Extremities: No edema b/l le  Neuro: A&O x3. No focal deficits    LABS:                        13.3   7.93  )-----------( 189      ( 2023 22:10 )             39.9         139  |  100  |  25<H>  ----------------------------<  113<H>  4.1   |  29  |  0.8    Ca    9.8      2023 22:10  Mg     2.4         TPro  7.6  /  Alb  4.5  /  TBili  0.6  /  DBili  x   /  AST  33  /  ALT  27  /  AlkPhos  82        Troponin T, Serum: <0.01 ng/mL (23 @ 22:10)    CARDIAC MARKERS ( 2023 22:10 )  x     / <0.01 ng/mL / x     / x     / x          RADIOLOGY:  CT Angio Chest PE Protocol w/ IV Cont (23): No evidence of acute intrathoracic pathology or pulmonary embolism.      EC Lead ECG:   Ventricular Rate 64 BPM    Atrial Rate 64 BPM    P-R Interval 160 ms    QRS Duration 94 ms    Q-T Interval 434 ms    QTC Calculation(Bazett) 447 ms    P Axis 35 degrees    R Axis 54 degrees    T Axis 49 degrees    Diagnosis Line Normal sinus rhythm  Normal ECG    Confirmed by SARA JARRETT, [facility] ([id number]) on 2023 10:37:12 PM ( @ 21:01)

## 2023-11-09 NOTE — ED ADULT NURSE NOTE - NSFALLHARMRISKINTERV_ED_ALL_ED

## 2023-11-09 NOTE — CONSULT NOTE ADULT - SUBJECTIVE AND OBJECTIVE BOX
Outpt cardiologist:    HPI:   68-year-old female with past medical history of hypertension hyperlipidemia presents to the ED with chest pain and shortness of breath.  Per family patient was doing well earlier today and at baseline, ate dinner, and shortly afterwards developed chest pain associated with nausea and 2 episodes of nb/nb vomiting. Family became concerned and EMS was called.  EMS reports patient was bradycardic approximately 38 bpm with hypotension 80s/50s.  EMS administered atropine 1 mg with improvement in heart rate to 60s bpm, and was given IV fluids en route with improvement in blood pressure as well. Per the son the patient had a similar episode few weeks ago but didn't seek medical care   Denies recent fevers illness chills shortness of breath cough abdominal pain or lower extremity edema.    In the ED , BP and HR 60 with ECG showing NSR  Labs unremarkable, D-dimer 480 , Trop 0.01  CXR with no consolidations    Patient admitted for bradycardic episode   (2023 02:02)      ---  cardio fellow additional notes:        PAST MEDICAL & SURGICAL HISTORY  Hypertension    Hyperlipidemia        FAMILY HISTORY:  FAMILY HISTORY:      SOCIAL HISTORY:  Social History:      ALLERGIES:  No Known Allergies      MEDICATIONS:  atropine Syringe 1 milliGRAM(s) IV Push once  DOPamine Infusion 0.5 MICROgram(s)/kG/Min (1.33 mL/Hr) IV Continuous <Continuous>  enoxaparin Injectable 40 milliGRAM(s) SubCutaneous every 24 hours    PRN:  acetaminophen     Tablet .. 650 milliGRAM(s) Oral every 6 hours PRN  aluminum hydroxide/magnesium hydroxide/simethicone Suspension 30 milliLiter(s) Oral every 4 hours PRN  melatonin 3 milliGRAM(s) Oral at bedtime PRN  ondansetron Injectable 4 milliGRAM(s) IV Push every 8 hours PRN      HOME MEDICATIONS:  Home Medications:      VITALS:   T(F): 96.5 ( @ 23:45), Max: 96.5 ( @ 23:45)  HR: 36 ( @ 06:38) (36 - 62)  BP: 98/55 ( @ 06:38) (95/56 - 118/59)  BP(mean): --  RR: 17 ( @ 02:29) (16 - 17)  SpO2: 96% ( @ 02:29) (96% - 96%)    I&O's Summary      REVIEW OF SYSTEMS:  CONSTITUTIONAL: No weakness, fevers or chills  HEENT: No visual changes, neck/ear pain  RESPIRATORY: No cough, sob  CARDIOVASCULAR: See HPI  GASTROINTESTINAL: No abdominal pain. No nausea, vomiting, diarrhea   GENITOURINARY: No dysuria, frequency or hematuria  NEUROLOGICAL: No new focal deficits  SKIN: No new rashes    PHYSICAL EXAM:  General: Not in distress.  Non-toxic appearing.   HEENT: EOMI  Cardio: regular, S1, S2, no murmur  Pulm: B/L BS.  No wheezing / crackles / rales  Abdomen: Soft, non-tender, non-distended. Normoactive bowel sounds  Extremities: No edema b/l le  Neuro: A&O x3. No focal deficits    LABS:                        13.3   7.93  )-----------( 189      ( 2023 22:10 )             39.9         139  |  100  |  25<H>  ----------------------------<  113<H>  4.1   |  29  |  0.8    Ca    9.8      2023 22:10  Mg     2.4         TPro  7.6  /  Alb  4.5  /  TBili  0.6  /  DBili  x   /  AST  33  /  ALT  27  /  AlkPhos  82        Troponin T, Serum: <0.01 ng/mL (23 @ 22:10)    CARDIAC MARKERS ( 2023 22:10 )  x     / <0.01 ng/mL / x     / x     / x            Troponin trend:            RADIOLOGY:  CT Angio Chest PE Protocol w/ IV Cont (23): No evidence of acute intrathoracic pathology or pulmonary embolism.      EC Lead ECG:   Ventricular Rate 64 BPM    Atrial Rate 64 BPM    P-R Interval 160 ms    QRS Duration 94 ms    Q-T Interval 434 ms    QTC Calculation(Bazett) 447 ms    P Axis 35 degrees    R Axis 54 degrees    T Axis 49 degrees    Diagnosis Line Normal sinus rhythm  Normal ECG    Confirmed by SARA JARRETT, [facility] ([id number]) on 2023 10:37:12 PM ( @ 21:01)     Outpt cardiologist: None    HPI:   68-year-old female with past medical history of hypertension hyperlipidemia presents to the ED with chest pain and shortness of breath.  Per family patient was doing well earlier today and at baseline, ate dinner, and shortly afterwards developed chest pain associated with nausea and 2 episodes of nb/nb vomiting. Family became concerned and EMS was called.  EMS reports patient was bradycardic approximately 38 bpm with hypotension 80s/50s.  EMS administered atropine 1 mg with improvement in heart rate to 60s bpm, and was given IV fluids en route with improvement in blood pressure as well. Per the son the patient had a similar episode few weeks ago but didn't seek medical care   Denies recent fevers illness chills shortness of breath cough abdominal pain or lower extremity edema.    In the ED , BP and HR 60 with ECG showing NSR  Labs unremarkable, D-dimer 480 , Trop 0.01  CXR with no consolidations    Patient admitted for bradycardic episode   (2023 02:02)      PAST MEDICAL & SURGICAL HISTORY  Hypertension  Hyperlipidemia        FAMILY HISTORY:  FAMILY HISTORY:      SOCIAL HISTORY:  Social History: neg x3      ALLERGIES:  No Known Allergies      MEDICATIONS:  atropine Syringe 1 milliGRAM(s) IV Push once  DOPamine Infusion 0.5 MICROgram(s)/kG/Min (1.33 mL/Hr) IV Continuous <Continuous>  enoxaparin Injectable 40 milliGRAM(s) SubCutaneous every 24 hours    PRN:  acetaminophen     Tablet .. 650 milliGRAM(s) Oral every 6 hours PRN  aluminum hydroxide/magnesium hydroxide/simethicone Suspension 30 milliLiter(s) Oral every 4 hours PRN  melatonin 3 milliGRAM(s) Oral at bedtime PRN  ondansetron Injectable 4 milliGRAM(s) IV Push every 8 hours PRN      HOME MEDICATIONS:  Home Medications:      VITALS:   T(F): 96.5 ( @ 23:45), Max: 96.5 ( @ 23:45)  HR: 36 ( @ 06:38) (36 - 62)  BP: 98/55 ( @ 06:38) (95/56 - 118/59)  BP(mean): --  RR: 17 ( @ 02:29) (16 - 17)  SpO2: 96% ( @ 02:29) (96% - 96%)    I&O's Summary      REVIEW OF SYSTEMS:  CONSTITUTIONAL: No weakness, fevers or chills  HEENT: No visual changes, neck/ear pain  RESPIRATORY: No cough, sob  CARDIOVASCULAR: See HPI  GASTROINTESTINAL: No abdominal pain. No nausea, vomiting, diarrhea   GENITOURINARY: No dysuria, frequency or hematuria  NEUROLOGICAL: No new focal deficits  SKIN: No new rashes    PHYSICAL EXAM:  General: Not in distress.  Non-toxic appearing.   HEENT: EOMI  Cardio: regular, S1, S2, no murmur, bradycardia   Pulm: B/L BS.  No wheezing / crackles / rales  Abdomen: Soft, non-tender, non-distended. Normoactive bowel sounds  Extremities: No edema b/l le  Neuro: A&O x3. No focal deficits    LABS:                        13.3   7.93  )-----------( 189      ( 2023 22:10 )             39.9         139  |  100  |  25<H>  ----------------------------<  113<H>  4.1   |  29  |  0.8    Ca    9.8      2023 22:10  Mg     2.4         TPro  7.6  /  Alb  4.5  /  TBili  0.6  /  DBili  x   /  AST  33  /  ALT  27  /  AlkPhos  82        Troponin T, Serum: <0.01 ng/mL (23 @ 22:10)    CARDIAC MARKERS ( 2023 22:10 )  x     / <0.01 ng/mL / x     / x     / x            Troponin trend:            RADIOLOGY:  CT Angio Chest PE Protocol w/ IV Cont (23): No evidence of acute intrathoracic pathology or pulmonary embolism.      EC Lead ECG:   Ventricular Rate 64 BPM    Atrial Rate 64 BPM    P-R Interval 160 ms    QRS Duration 94 ms    Q-T Interval 434 ms    QTC Calculation(Bazett) 447 ms    P Axis 35 degrees    R Axis 54 degrees    T Axis 49 degrees    Diagnosis Line Normal sinus rhythm  Normal ECG    Confirmed by SARA JARRETT, [facility] ([id number]) on 2023 10:37:12 PM ( @ 21:01)     Outpt cardiologist: None    HPI:   68-year-old female with past medical history of hypertension hyperlipidemia presents to the ED with chest pain and shortness of breath.  Per family patient was doing well earlier today and at baseline, ate dinner, and shortly afterwards developed chest pain associated with nausea and 2 episodes of nb/nb vomiting. Family became concerned and EMS was called.  EMS reports patient was bradycardic approximately 38 bpm with hypotension 80s/50s.  EMS administered atropine 1 mg with improvement in heart rate to 60s bpm, and was given IV fluids en route with improvement in blood pressure as well. Per the son the patient had a similar episode few weeks ago but didn't seek medical care   Denies recent fevers illness chills shortness of breath cough abdominal pain or lower extremity edema.    In the ED , BP and HR 60 with ECG showing NSR  Labs unremarkable, D-dimer 480 , Trop 0.01  CXR with no consolidations    Patient admitted for bradycardic episode   (2023 02:02)      PAST MEDICAL & SURGICAL HISTORY  Hypertension  Hyperlipidemia        FAMILY HISTORY:  FAMILY HISTORY: no family history of premature CAD or SCD      SOCIAL HISTORY:  Social History: neg x3      ALLERGIES:  No Known Allergies      MEDICATIONS:  atropine Syringe 1 milliGRAM(s) IV Push once  DOPamine Infusion 0.5 MICROgram(s)/kG/Min (1.33 mL/Hr) IV Continuous <Continuous>  enoxaparin Injectable 40 milliGRAM(s) SubCutaneous every 24 hours    PRN:  acetaminophen     Tablet .. 650 milliGRAM(s) Oral every 6 hours PRN  aluminum hydroxide/magnesium hydroxide/simethicone Suspension 30 milliLiter(s) Oral every 4 hours PRN  melatonin 3 milliGRAM(s) Oral at bedtime PRN  ondansetron Injectable 4 milliGRAM(s) IV Push every 8 hours PRN      HOME MEDICATIONS:  Home Medications:      VITALS:   T(F): 96.5 ( @ 23:45), Max: 96.5 ( @ 23:45)  HR: 36 ( @ 06:38) (36 - 62)  BP: 98/55 ( @ 06:38) (95/56 - 118/59)  BP(mean): --  RR: 17 ( @ 02:29) (16 - 17)  SpO2: 96% ( @ 02:29) (96% - 96%)    I&O's Summary      REVIEW OF SYSTEMS:  CONSTITUTIONAL: No weakness, fevers or chills  HEENT: No visual changes, neck/ear pain  RESPIRATORY: No cough, sob  CARDIOVASCULAR: See HPI  GASTROINTESTINAL: No abdominal pain. No nausea, vomiting, diarrhea   GENITOURINARY: No dysuria, frequency or hematuria  NEUROLOGICAL: No new focal deficits  SKIN: No new rashes  10 point ROS completed; negative except as stated in HPI    PHYSICAL EXAM:  General: Not in distress.  Non-toxic appearing.   HEENT: EOMI, PERRLA  Neck: supple, no JVD  Cardio: regular, S1, S2, no murmur, bradycardia   Pulm: B/L BS.  No wheezing / crackles / rales  Abdomen: Soft, non-tender, non-distended. Normoactive bowel sounds  Extremities: No edema b/l le  Neuro: A&O x3. No focal deficits    LABS:                        13.3   7.93  )-----------( 189      ( 2023 22:10 )             39.9         139  |  100  |  25<H>  ----------------------------<  113<H>  4.1   |  29  |  0.8    Ca    9.8      2023 22:10  Mg     2.4         TPro  7.6  /  Alb  4.5  /  TBili  0.6  /  DBili  x   /  AST  33  /  ALT  27  /  AlkPhos  82        Troponin T, Serum: <0.01 ng/mL (23 @ 22:10)    CARDIAC MARKERS ( 2023 22:10 )  x     / <0.01 ng/mL / x     / x     / x            Troponin trend:            RADIOLOGY:  CT Angio Chest PE Protocol w/ IV Cont (23): No evidence of acute intrathoracic pathology or pulmonary embolism.      EC Lead ECG:   Ventricular Rate 64 BPM    Atrial Rate 64 BPM    P-R Interval 160 ms    QRS Duration 94 ms    Q-T Interval 434 ms    QTC Calculation(Bazett) 447 ms    P Axis 35 degrees    R Axis 54 degrees    T Axis 49 degrees    Diagnosis Line Normal sinus rhythm  Normal ECG    Confirmed by SARA JARRETT, [facility] ([id number]) on 2023 10:37:12 PM ( @ 21:01)

## 2023-11-09 NOTE — H&P ADULT - HISTORY OF PRESENT ILLNESS
68-year-old female with past medical history of hypertension hyperlipidemia presents to the ED with chest pain and shortness of breath.  Per family patient was doing well earlier today and at baseline, ate dinner, and shortly afterwards developed chest pain associated with nausea and 2 episodes of nb/nb vomiting. Family became concerned and EMS was called.  EMS reports patient was bradycardic approximately 38 bpm with hypotension 80s/50s.  EMS administered atropine 1 mg with improvement in heart rate to 60s bpm, and was given IV fluids en route with improvement in blood pressure as well.  Denies recent fevers illness chills shortness of breath cough abdominal pain or lower extremity edema.    In the ED , BP and HR 60 with ECG showing NSR  Labs unremarkable, D-dimer 480 , Trop 0.01  CXR with no consolidations    Patient admitted for bradycardic episode    68-year-old female with past medical history of hypertension hyperlipidemia presents to the ED with chest pain and shortness of breath.  Per family patient was doing well earlier today and at baseline, ate dinner, and shortly afterwards developed chest pain associated with nausea and 2 episodes of nb/nb vomiting. Family became concerned and EMS was called.  EMS reports patient was bradycardic approximately 38 bpm with hypotension 80s/50s.  EMS administered atropine 1 mg with improvement in heart rate to 60s bpm, and was given IV fluids en route with improvement in blood pressure as well. Per the son the patient had a similar episode few weeks ago but didn't seek medical care   Denies recent fevers illness chills shortness of breath cough abdominal pain or lower extremity edema.    In the ED , BP and HR 60 with ECG showing NSR  Labs unremarkable, D-dimer 480 , Trop 0.01  CXR with no consolidations    Patient admitted for bradycardic episode

## 2023-11-09 NOTE — CONSULT NOTE ADULT - ASSESSMENT
68-year-old female with past medical history of hypertension hyperlipidemia presents to the ED with chest pain associated with nausea and 2 episodes of nb/nb vomiting. EMS reports patient was bradycardic approximately 38 bpm with hypotension 80s/50s.  EMS administered atropine 1 mg with improvement in heart rate to 60s bpm, and was given IV fluids en route with improvement in blood pressure as well. Per the son the patient had a similar episode few weeks ago but didn't seek medical care    # SSS  # HTN / HLD  - HR 30s with EMS --> s/p 1 mg of Atropine --> HR 60s  - HR in ER back to 30s-40s with lighthea``dedness --> start dopamine @5  - Trop 0.01  - CT Angio Chest PE Protocol w/ IV Cont (11.09.23): No evidence of acute intrathoracic pathology or pulmonary embolism.  - hold home BB and CCB  - check ECHO   - check Lyme titers, TFTs, Lipid panel and A1C  - keep NPO for now   - EP consult   - tele monitoring    68-year-old female with past medical history of hypertension hyperlipidemia presents to the ED with chest pain associated with nausea and 2 episodes of nb/nb vomiting. EMS reports patient was bradycardic approximately 38 bpm with hypotension 80s/50s.  EMS administered atropine 1 mg with improvement in heart rate to 60s bpm, and was given IV fluids en route with improvement in blood pressure as well. Per the son the patient had a similar episode few weeks ago but didn't seek medical care    # 2nd Deg HB (Mobitz type 2)  # HTN / HLD  - HR 30s with EMS --> s/p 1 mg of Atropine --> HR 60s  - HR in ER back to 30s-40s with light-headedness --> start dopamine @5  - tele: bradycardia with drop beat  - EKG: Sinus akshat   - Trop 0.01  - CT Angio Chest PE Protocol w/ IV Cont (11.09.23): No evidence of acute intrathoracic pathology or pulmonary embolism.  - not on oft blockers at home   - check ECHO   - check Lyme titers, TFTs, Lipid panel and A1C  - keep NPO for now   - EP consult   - tele monitoring

## 2023-11-09 NOTE — ED ADULT NURSE NOTE - SUICIDE SCREENING QUESTION 3
Quality 226: Preventive Care And Screening: Tobacco Use: Screening And Cessation Intervention: Patient screened for tobacco use and is an ex/non-smoker Quality 130: Documentation Of Current Medications In The Medical Record: Current Medications Documented Quality 431: Preventive Care And Screening: Unhealthy Alcohol Use - Screening: Patient not identified as an unhealthy alcohol user when screened for unhealthy alcohol use using a systematic screening method Detail Level: Detailed No

## 2023-11-09 NOTE — CONSULT NOTE ADULT - ASSESSMENT
68-year-old female with past medical history of hypertension hyperlipidemia presents to the ED with chest pain associated with nausea and 2 episodes of nb/nb vomiting. EMS reports patient was bradycardic approximately 38 bpm with hypotension 80s/50s.  EMS administered atropine 1 mg with improvement in heart rate to 60s bpm, and was given IV fluids en route with improvement in blood pressure as well. Per the son the patient had a similar episode few weeks ago but didn't seek medical care    # 2nd Deg HB (Mobitz type 2)  # HTN / HLD  - HR 30s with EMS --> s/p 1 mg of Atropine --> HR 60s  - HR in ER back to 30s-40s with light-headedness --> start dopamine @5  - tele: bradycardia with drop beat  - EKG: Sinus akshat   - Trop 0.01  - CT Angio Chest PE Protocol w/ IV Cont (11.09.23): No evidence of acute intrathoracic pathology or pulmonary embolism.  - not on otf blockers at home   - check ECHO   - check Lyme titers, TFTs, Lipid panel and A1C  - keep NPO for now     - attending to see pt  68-year-old female with past medical history of hypertension hyperlipidemia presents to the ED with chest pain associated with nausea and 2 episodes of nb/nb vomiting. EMS reports patient was bradycardic approximately 38 bpm with hypotension 80s/50s.  EMS administered atropine 1 mg with improvement in heart rate to 60s bpm, and was given IV fluids en route with improvement in blood pressure as well. Per the son the patient had a similar episode few weeks ago but didn't seek medical care    # Sinus bradycardia  # possible sick sinus syndrome  # HTN / HLD  - HR 30s with EMS --> s/p 1 mg of Atropine --> HR 60s  - HR in ER back to 30s-40s with light-headedness --> start dopamine @5  - tele: bradycardia with drop beat  - EKG: Sinus akshat   - Trop 0.01  - CT Angio Chest PE Protocol w/ IV Cont (11.09.23): No evidence of acute intrathoracic pathology or pulmonary embolism.  - not on otf blockers at home   - check ECHO   - check Lyme titers, TFTs, Lipid panel and A1C  - keep NPO for now     - attending to see pt

## 2023-11-09 NOTE — PATIENT PROFILE ADULT - RELATIONSHIP TO PATIENT
Render Note In Bullet Format When Appropriate: No Post-Care Instructions: I reviewed with the patient in detail post-care instructions. Patient is to wear sunprotection, and avoid picking at any of the treated lesions. Pt may apply Vaseline to crusted or scabbing areas. Medical Necessity Clause: This procedure was medically necessary because the lesions that were treated were: Consent: The patient's consent was obtained including but not limited to risks of crusting, scabbing, blistering, scarring, darker or lighter pigmentary change, recurrence, incomplete removal and infection. Medical Necessity Information: It is in your best interest to select a reason for this procedure from the list below. All of these items fulfill various CMS LCD requirements except the new and changing color options. Detail Level: Detailed son or daughter

## 2023-11-09 NOTE — CHART NOTE - NSCHARTNOTEFT_GEN_A_CORE
Transfer Note    Transfer from: tele    Transfer to: cardiac step down    HPI:  68-year-old female with past medical history of hypertension hyperlipidemia presents to the ED with chest pain and shortness of breath.  Per family patient was doing well earlier today and at baseline, ate dinner, and shortly afterwards developed chest pain associated with nausea and 2 episodes of nb/nb vomiting. Family became concerned and EMS was called.  EMS reports patient was bradycardic approximately 38 bpm with hypotension 80s/50s.  EMS administered atropine 1 mg with improvement in heart rate to 60s bpm, and was given IV fluids en route with improvement in blood pressure as well. Per the son the patient had a similar episode few weeks ago but didn't seek medical care   Denies recent fevers illness chills shortness of breath cough abdominal pain or lower extremity edema.    In the ED , BP and HR 60 with ECG showing NSR  Labs unremarkable, D-dimer 480 , Trop 0.01  CXR with no consolidations      Hospital course:  Pt became akshat in 40's, EKG showed sinus akshat, pt was completely asymptomatic    Rapid was called for HR 37 BP 98/55  Repeat /63  HR ranged 30's-50's  Pt AOx4, reports minor dizziness and pt feels tired (hasn't slept all night)  When pt came to the hospital she had near syncopal episode and chest discomfort  Pt on tele monitor  EKG from 4:37 sinus akshat  Confirmed home meds with family, not on home beta blockers - on home losartan/hctz and amlodipine  Asked pt for weight as none were in the system, pt weighs 156lbs    Stable sinus akshat will need ischemic workup and investigate for chronotropic competence    Pt started on dobutamine drip        LABS:   CARDIAC MARKERS ( 08 Nov 2023 22:10 )  x     / <0.01 ng/mL / x     / x     / x                                  13.3   7.93  )-----------( 189      ( 08 Nov 2023 22:10 )             39.9       11-08    139  |  100  |  25<H>  ----------------------------<  113<H>  4.1   |  29  |  0.8    Ca    9.8      08 Nov 2023 22:10  Mg     2.4     11-08    TPro  7.6  /  Alb  4.5  /  TBili  0.6  /  DBili  x   /  AST  33  /  ALT  27  /  AlkPhos  82  11-08            -------------------------------------------------------------------------------------------------  RADIOLOGY:  CT angio chest PE protocol  IMPRESSION:  No evidence of acute intrathoracic pathology or pulmonary embolism.    CT head    IMPRESSION:    No acute intracranial pathology.    Left parietal scalp hematoma. Transfer Note    Transfer from: tele    Transfer to: cardiac step down    HPI:  68-year-old female with past medical history of hypertension hyperlipidemia presents to the ED with chest pain and shortness of breath.  Per family patient was doing well earlier today and at baseline, ate dinner, and shortly afterwards developed chest pain associated with nausea and 2 episodes of nb/nb vomiting. Family became concerned and EMS was called.  EMS reports patient was bradycardic approximately 38 bpm with hypotension 80s/50s.  EMS administered atropine 1 mg with improvement in heart rate to 60s bpm, and was given IV fluids en route with improvement in blood pressure as well. Per the son the patient had a similar episode few weeks ago but didn't seek medical care   Denies recent fevers illness chills shortness of breath cough abdominal pain or lower extremity edema.    In the ED , BP and HR 60 with ECG showing NSR  Labs unremarkable, D-dimer 480 , Trop 0.01  CXR with no consolidations      Hospital course:  Pt became akshat in 40's, EKG showed sinus akshat, pt was completely asymptomatic    Rapid was called for HR 37 BP 98/55  Repeat /63  HR ranged 30's-50's  Pt AOx4, reports minor dizziness and pt feels tired (hasn't slept all night)  When pt came to the hospital she had near syncopal episode and chest discomfort  Pt on tele monitor  EKG from 4:37 sinus akshat  Confirmed home meds with family, not on home beta blockers - on home losartan/hctz and amlodipine  Asked pt for weight as none were in the system, pt weighs 156lbs    Stable sinus akshat will need ischemic workup and investigate for chronotropic competence    Pt started on dopamine drip        LABS:   CARDIAC MARKERS ( 08 Nov 2023 22:10 )  x     / <0.01 ng/mL / x     / x     / x                                  13.3   7.93  )-----------( 189      ( 08 Nov 2023 22:10 )             39.9       11-08    139  |  100  |  25<H>  ----------------------------<  113<H>  4.1   |  29  |  0.8    Ca    9.8      08 Nov 2023 22:10  Mg     2.4     11-08    TPro  7.6  /  Alb  4.5  /  TBili  0.6  /  DBili  x   /  AST  33  /  ALT  27  /  AlkPhos  82  11-08            -------------------------------------------------------------------------------------------------  RADIOLOGY:  CT angio chest PE protocol  IMPRESSION:  No evidence of acute intrathoracic pathology or pulmonary embolism.    CT head    IMPRESSION:    No acute intracranial pathology.    Left parietal scalp hematoma.

## 2023-11-09 NOTE — RAPID RESPONSE TEAM SUMMARY - NSSITUATIONBACKGROUNDRRT_GEN_ALL_CORE
Rapid was called for HR 37 BP 98/55 Rapid was called for HR 37 BP 98/55. BP improved spontaneously without intervention. However HR remained ranging from 37-50.  Repeat /63  HR ranged 35's-50's  Pt AOx4, reports minor dizziness and pt feels tired (hasn't slept all night)  When pt came to the hospital she had near syncopal episode and chest discomfort  Pt on tele monitor  EKG from 4:37 sinus akshat. Discussed the case with cardiac fellow on call. Upon check tele monitor, patient's HR drops intermittently --> suspected high grade AVB.   Decision was made to start patient on dopamine drip and upgrade to cardiac step down unit.       Attending Attestation:    I have personally and independently provided 60 minutes of critical care services. This excludes any time spent on separate procedures or teaching.

## 2023-11-09 NOTE — CONSULT NOTE ADULT - ATTENDING COMMENTS
complex patient  sinus bradycardia with symptoms  Possible sick sinus syndrome  will reassess in am for need for PPM  awaiting TSH and lyme
Briefly, 68 year old woman for whom cardiology is consulted for bradycardia. Interval events noted. Patient on dopamine drip, transferred to cardiology step down, currently hemodynamically stable on dopamine drip. She is not on AV otf blockers at home. Would investigate further into the herbal medications she takes. Check echocardiogram. EP consult.     Thank you for this consult. Please call/MS teams with questions.

## 2023-11-09 NOTE — H&P ADULT - ASSESSMENT
68-year-old female with past medical history of hypertension hyperlipidemia presents to the ED with chest pain and shortness of breath.     #Symptomatic Bradycardia  #Possible Vagal Reaction after Vomiting vs Underlying Cardiac Disease  - Ti 30 with EMS and received 1 mg of Atropine  - Currently HR 60  - Stop BB and Non DH CCB  - Check Lyme titers and TFTs    #HLD  #HTN  - c/w Home meds  - Check A1c,  and lipids    DVTppx: Lovenox  GI ppx: Famotidine  Diet: DASH  Dispo: MED TELE 68-year-old female with past medical history of hypertension hyperlipidemia presents to the ED with chest pain and shortness of breath.     #Symptomatic Bradycardia  #Possible Vagal Reaction after Vomiting vs Underlying Cardiac Disease  - Ti 30 with EMS and received 1 mg of Atropine  - Currently HR 60  - Stop BB and Non DH CCB  - Trop 0.01 and Dimer 480  - Check Lyme titers and TFTs    #HLD  #HTN  - c/w Home meds  - Check A1c,  and lipids    DVTppx: Lovenox  GI ppx: Famotidine  Diet: DASH  Dispo: MED TELE 68-year-old female with past medical history of hypertension hyperlipidemia presents to the ED with chest pain and shortness of breath.     #Symptomatic Bradycardia  #Possible Vagal Reaction after Vomiting vs Underlying Cardiac Disease  - Ti 30 with EMS and received 1 mg of Atropine  - Currently HR 60  - Stop BB and Non DH CCB  - Trop 0.01 and Dimer 480  - Check Lyme titers and TFTs  - Check ECho    #HLD  #HTN  - c/w Home meds (family will bring them tomorrow)  - Check A1c,  and lipids    DVTppx: Lovenox  GI ppx: Famotidine  Diet: DASH  Dispo: MED TELE 68-year-old female with past medical history of hypertension hyperlipidemia presents to the ED with chest pain and shortness of breath.     #Symptomatic Bradycardia  #Possible Vagal Reaction after Vomiting vs Underlying Cardiac Disease  - Ti 30 with EMS and received 1 mg of Atropine  - Currently HR 60  - Stop BB and Non DH CCB  - Trop 0.01 and Dimer 480  - Check Lyme titers and TFTs  - Check ECho and CT chest PE protocol    #HLD  #HTN  - c/w Home meds (family will bring them tomorrow)  - Check A1c,  and lipids    DVTppx: Lovenox  GI ppx: Famotidine  Diet: DASH  Dispo: MED TELE

## 2023-11-09 NOTE — H&P ADULT - NSHPPHYSICALEXAM_GEN_ALL_CORE
LOS: 1d    VITALS:   T(C): 35.8 (11-08-23 @ 23:45), Max: 35.8 (11-08-23 @ 23:45)  HR: 62 (11-08-23 @ 20:33) (62 - 62)  BP: 95/56 (11-08-23 @ 23:45) (95/56 - 118/59)  RR: 16 (11-08-23 @ 20:33) (16 - 16)  SpO2: 96% (11-08-23 @ 20:33) (96% - 96%)    GENERAL: NAD, lying in bed comfortably  HEAD:  Atraumatic, Normocephalic  EYES: EOMI, PERRLA, conjunctiva and sclera clear  ENT: Moist mucous membranes  NECK: Supple, No JVD  CHEST/LUNG: Clear to auscultation bilaterally; No rales, rhonchi, wheezing, or rubs. Unlabored respirations  HEART: Regular rate and rhythm; No murmurs, rubs, or gallops  ABDOMEN: BSx4; Soft, nontender, nondistended  EXTREMITIES:  2+ Peripheral Pulses, brisk capillary refill. No clubbing, cyanosis, or edema  NERVOUS SYSTEM:  A&Ox3, no focal deficits   SKIN: No rashes or lesions

## 2023-11-09 NOTE — PATIENT PROFILE ADULT - FALL HARM RISK - HARM RISK INTERVENTIONS

## 2023-11-09 NOTE — RAPID RESPONSE TEAM SUMMARY - NSADDTLFINDINGSRRT_GEN_ALL_CORE
Repeat /63  HR ranged 30's-50's  Pt AOx4, reports minor dizziness and pt feels tired (hasn't slept all night)  When pt came to the hospital she had near syncopal episode and chest discomfort  Pt on tele monitor  EKG from 4:37 sinus akshat  Confirmed home meds with family, not on home beta blockers - on home losartan/hctz and amlodipine  Asked pt for weight as none were in the system, pt weighs 156lbs    Stable sinus akshat will need ischemic workup and investigate for chronotropic competence  Confirmed home meds with family, not on home beta blockers - on home losartan/hctz and amlodipine for HTN and atorvastatin for HLD.   Asked pt for weight as none were in the system, pt weighs 156lbs.

## 2023-11-09 NOTE — ED ADULT NURSE NOTE - OBJECTIVE STATEMENT
68 yof biba from home. pt c/o chest pain and vomited x1. EMS stated HR 36,1mg atropine given, pt's HR went up to 60's. Son and daughter in law at bed side.

## 2023-11-10 ENCOUNTER — NON-APPOINTMENT (OUTPATIENT)
Age: 68
End: 2023-11-10

## 2023-11-10 PROBLEM — E78.5 HYPERLIPIDEMIA, UNSPECIFIED: Chronic | Status: ACTIVE | Noted: 2023-11-09

## 2023-11-10 PROBLEM — I10 ESSENTIAL (PRIMARY) HYPERTENSION: Chronic | Status: ACTIVE | Noted: 2023-11-09

## 2023-11-10 LAB
ALBUMIN SERPL ELPH-MCNC: 4.2 G/DL — SIGNIFICANT CHANGE UP (ref 3.5–5.2)
ALBUMIN SERPL ELPH-MCNC: 4.2 G/DL — SIGNIFICANT CHANGE UP (ref 3.5–5.2)
ALP SERPL-CCNC: 77 U/L — SIGNIFICANT CHANGE UP (ref 30–115)
ALP SERPL-CCNC: 77 U/L — SIGNIFICANT CHANGE UP (ref 30–115)
ALT FLD-CCNC: 21 U/L — SIGNIFICANT CHANGE UP (ref 0–41)
ALT FLD-CCNC: 21 U/L — SIGNIFICANT CHANGE UP (ref 0–41)
ANION GAP SERPL CALC-SCNC: 11 MMOL/L — SIGNIFICANT CHANGE UP (ref 7–14)
ANION GAP SERPL CALC-SCNC: 11 MMOL/L — SIGNIFICANT CHANGE UP (ref 7–14)
AST SERPL-CCNC: 21 U/L — SIGNIFICANT CHANGE UP (ref 0–41)
AST SERPL-CCNC: 21 U/L — SIGNIFICANT CHANGE UP (ref 0–41)
B BURGDOR C6 AB SER-ACNC: NEGATIVE — SIGNIFICANT CHANGE UP
B BURGDOR C6 AB SER-ACNC: NEGATIVE — SIGNIFICANT CHANGE UP
B BURGDOR IGG+IGM SER-ACNC: 0.22 INDEX — SIGNIFICANT CHANGE UP (ref 0.01–0.89)
B BURGDOR IGG+IGM SER-ACNC: 0.22 INDEX — SIGNIFICANT CHANGE UP (ref 0.01–0.89)
BILIRUB SERPL-MCNC: 0.7 MG/DL — SIGNIFICANT CHANGE UP (ref 0.2–1.2)
BILIRUB SERPL-MCNC: 0.7 MG/DL — SIGNIFICANT CHANGE UP (ref 0.2–1.2)
BUN SERPL-MCNC: 17 MG/DL — SIGNIFICANT CHANGE UP (ref 10–20)
BUN SERPL-MCNC: 17 MG/DL — SIGNIFICANT CHANGE UP (ref 10–20)
CALCIUM SERPL-MCNC: 9.2 MG/DL — SIGNIFICANT CHANGE UP (ref 8.4–10.5)
CALCIUM SERPL-MCNC: 9.2 MG/DL — SIGNIFICANT CHANGE UP (ref 8.4–10.5)
CHLORIDE SERPL-SCNC: 104 MMOL/L — SIGNIFICANT CHANGE UP (ref 98–110)
CHLORIDE SERPL-SCNC: 104 MMOL/L — SIGNIFICANT CHANGE UP (ref 98–110)
CO2 SERPL-SCNC: 25 MMOL/L — SIGNIFICANT CHANGE UP (ref 17–32)
CO2 SERPL-SCNC: 25 MMOL/L — SIGNIFICANT CHANGE UP (ref 17–32)
CREAT SERPL-MCNC: 0.6 MG/DL — LOW (ref 0.7–1.5)
CREAT SERPL-MCNC: 0.6 MG/DL — LOW (ref 0.7–1.5)
EGFR: 98 ML/MIN/1.73M2 — SIGNIFICANT CHANGE UP
EGFR: 98 ML/MIN/1.73M2 — SIGNIFICANT CHANGE UP
GLUCOSE SERPL-MCNC: 133 MG/DL — HIGH (ref 70–99)
GLUCOSE SERPL-MCNC: 133 MG/DL — HIGH (ref 70–99)
HCT VFR BLD CALC: 38.9 % — SIGNIFICANT CHANGE UP (ref 37–47)
HCT VFR BLD CALC: 38.9 % — SIGNIFICANT CHANGE UP (ref 37–47)
HGB BLD-MCNC: 13 G/DL — SIGNIFICANT CHANGE UP (ref 12–16)
HGB BLD-MCNC: 13 G/DL — SIGNIFICANT CHANGE UP (ref 12–16)
LACTATE SERPL-SCNC: 0.8 MMOL/L — SIGNIFICANT CHANGE UP (ref 0.7–2)
LACTATE SERPL-SCNC: 0.8 MMOL/L — SIGNIFICANT CHANGE UP (ref 0.7–2)
MAGNESIUM SERPL-MCNC: 2.3 MG/DL — SIGNIFICANT CHANGE UP (ref 1.8–2.4)
MAGNESIUM SERPL-MCNC: 2.3 MG/DL — SIGNIFICANT CHANGE UP (ref 1.8–2.4)
MCHC RBC-ENTMCNC: 30.5 PG — SIGNIFICANT CHANGE UP (ref 27–31)
MCHC RBC-ENTMCNC: 30.5 PG — SIGNIFICANT CHANGE UP (ref 27–31)
MCHC RBC-ENTMCNC: 33.4 G/DL — SIGNIFICANT CHANGE UP (ref 32–37)
MCHC RBC-ENTMCNC: 33.4 G/DL — SIGNIFICANT CHANGE UP (ref 32–37)
MCV RBC AUTO: 91.3 FL — SIGNIFICANT CHANGE UP (ref 81–99)
MCV RBC AUTO: 91.3 FL — SIGNIFICANT CHANGE UP (ref 81–99)
NRBC # BLD: 0 /100 WBCS — SIGNIFICANT CHANGE UP (ref 0–0)
NRBC # BLD: 0 /100 WBCS — SIGNIFICANT CHANGE UP (ref 0–0)
PLATELET # BLD AUTO: 175 K/UL — SIGNIFICANT CHANGE UP (ref 130–400)
PLATELET # BLD AUTO: 175 K/UL — SIGNIFICANT CHANGE UP (ref 130–400)
PMV BLD: 10.6 FL — HIGH (ref 7.4–10.4)
PMV BLD: 10.6 FL — HIGH (ref 7.4–10.4)
POTASSIUM SERPL-MCNC: 3.6 MMOL/L — SIGNIFICANT CHANGE UP (ref 3.5–5)
POTASSIUM SERPL-MCNC: 3.6 MMOL/L — SIGNIFICANT CHANGE UP (ref 3.5–5)
POTASSIUM SERPL-SCNC: 3.6 MMOL/L — SIGNIFICANT CHANGE UP (ref 3.5–5)
POTASSIUM SERPL-SCNC: 3.6 MMOL/L — SIGNIFICANT CHANGE UP (ref 3.5–5)
PROT SERPL-MCNC: 7.3 G/DL — SIGNIFICANT CHANGE UP (ref 6–8)
PROT SERPL-MCNC: 7.3 G/DL — SIGNIFICANT CHANGE UP (ref 6–8)
RBC # BLD: 4.26 M/UL — SIGNIFICANT CHANGE UP (ref 4.2–5.4)
RBC # BLD: 4.26 M/UL — SIGNIFICANT CHANGE UP (ref 4.2–5.4)
RBC # FLD: 12.3 % — SIGNIFICANT CHANGE UP (ref 11.5–14.5)
RBC # FLD: 12.3 % — SIGNIFICANT CHANGE UP (ref 11.5–14.5)
SODIUM SERPL-SCNC: 140 MMOL/L — SIGNIFICANT CHANGE UP (ref 135–146)
SODIUM SERPL-SCNC: 140 MMOL/L — SIGNIFICANT CHANGE UP (ref 135–146)
WBC # BLD: 8.12 K/UL — SIGNIFICANT CHANGE UP (ref 4.8–10.8)
WBC # BLD: 8.12 K/UL — SIGNIFICANT CHANGE UP (ref 4.8–10.8)
WBC # FLD AUTO: 8.12 K/UL — SIGNIFICANT CHANGE UP (ref 4.8–10.8)
WBC # FLD AUTO: 8.12 K/UL — SIGNIFICANT CHANGE UP (ref 4.8–10.8)

## 2023-11-10 PROCEDURE — 99233 SBSQ HOSP IP/OBS HIGH 50: CPT | Mod: 57

## 2023-11-10 PROCEDURE — 71045 X-RAY EXAM CHEST 1 VIEW: CPT | Mod: 26

## 2023-11-10 PROCEDURE — 99291 CRITICAL CARE FIRST HOUR: CPT | Mod: 1L

## 2023-11-10 PROCEDURE — 33208 INSRT HEART PM ATRIAL & VENT: CPT | Mod: KX

## 2023-11-10 RX ORDER — HYDROMORPHONE HYDROCHLORIDE 2 MG/ML
1 INJECTION INTRAMUSCULAR; INTRAVENOUS; SUBCUTANEOUS
Refills: 0 | Status: DISCONTINUED | OUTPATIENT
Start: 2023-11-10 | End: 2023-11-10

## 2023-11-10 RX ORDER — ACETAMINOPHEN 500 MG
650 TABLET ORAL EVERY 6 HOURS
Refills: 0 | Status: DISCONTINUED | OUTPATIENT
Start: 2023-11-10 | End: 2023-11-11

## 2023-11-10 RX ORDER — SENNA PLUS 8.6 MG/1
2 TABLET ORAL AT BEDTIME
Refills: 0 | Status: DISCONTINUED | OUTPATIENT
Start: 2023-11-10 | End: 2023-11-11

## 2023-11-10 RX ORDER — ACETAMINOPHEN 500 MG
650 TABLET ORAL ONCE
Refills: 0 | Status: COMPLETED | OUTPATIENT
Start: 2023-11-10 | End: 2023-11-10

## 2023-11-10 RX ORDER — DOPAMINE HYDROCHLORIDE 40 MG/ML
10 INJECTION, SOLUTION, CONCENTRATE INTRAVENOUS
Qty: 400 | Refills: 0 | Status: DISCONTINUED | OUTPATIENT
Start: 2023-11-10 | End: 2023-11-10

## 2023-11-10 RX ORDER — LANOLIN ALCOHOL/MO/W.PET/CERES
3 CREAM (GRAM) TOPICAL AT BEDTIME
Refills: 0 | Status: DISCONTINUED | OUTPATIENT
Start: 2023-11-10 | End: 2023-11-11

## 2023-11-10 RX ORDER — SODIUM CHLORIDE 9 MG/ML
1000 INJECTION, SOLUTION INTRAVENOUS
Refills: 0 | Status: DISCONTINUED | OUTPATIENT
Start: 2023-11-10 | End: 2023-11-10

## 2023-11-10 RX ORDER — POLYETHYLENE GLYCOL 3350 17 G/17G
17 POWDER, FOR SOLUTION ORAL
Refills: 0 | Status: DISCONTINUED | OUTPATIENT
Start: 2023-11-10 | End: 2023-11-11

## 2023-11-10 RX ORDER — ATORVASTATIN CALCIUM 80 MG/1
20 TABLET, FILM COATED ORAL AT BEDTIME
Refills: 0 | Status: DISCONTINUED | OUTPATIENT
Start: 2023-11-10 | End: 2023-11-11

## 2023-11-10 RX ORDER — CHLORHEXIDINE GLUCONATE 213 G/1000ML
1 SOLUTION TOPICAL
Refills: 0 | Status: DISCONTINUED | OUTPATIENT
Start: 2023-11-10 | End: 2023-11-11

## 2023-11-10 RX ORDER — CEFAZOLIN SODIUM 1 G
1000 VIAL (EA) INJECTION EVERY 8 HOURS
Refills: 0 | Status: COMPLETED | OUTPATIENT
Start: 2023-11-10 | End: 2023-11-11

## 2023-11-10 RX ORDER — ONDANSETRON 8 MG/1
4 TABLET, FILM COATED ORAL EVERY 8 HOURS
Refills: 0 | Status: DISCONTINUED | OUTPATIENT
Start: 2023-11-10 | End: 2023-11-11

## 2023-11-10 RX ORDER — HYDROMORPHONE HYDROCHLORIDE 2 MG/ML
0.5 INJECTION INTRAMUSCULAR; INTRAVENOUS; SUBCUTANEOUS
Refills: 0 | Status: DISCONTINUED | OUTPATIENT
Start: 2023-11-10 | End: 2023-11-10

## 2023-11-10 RX ADMIN — SENNA PLUS 2 TABLET(S): 8.6 TABLET ORAL at 21:39

## 2023-11-10 RX ADMIN — ATORVASTATIN CALCIUM 20 MILLIGRAM(S): 80 TABLET, FILM COATED ORAL at 21:39

## 2023-11-10 RX ADMIN — Medication 650 MILLIGRAM(S): at 23:49

## 2023-11-10 RX ADMIN — Medication 650 MILLIGRAM(S): at 17:37

## 2023-11-10 RX ADMIN — CHLORHEXIDINE GLUCONATE 1 APPLICATION(S): 213 SOLUTION TOPICAL at 21:42

## 2023-11-10 RX ADMIN — Medication 100 MILLIGRAM(S): at 21:39

## 2023-11-10 RX ADMIN — Medication 100 MILLIGRAM(S): at 14:37

## 2023-11-10 RX ADMIN — CHLORHEXIDINE GLUCONATE 1 APPLICATION(S): 213 SOLUTION TOPICAL at 05:50

## 2023-11-10 NOTE — PROGRESS NOTE ADULT - NS ATTEND AMEND GEN_ALL_CORE FT
Complex patient  Sick sinus syndrome  no reversible causes  recommend PPM  We discussed the risks/benefits/alternatives, nature of procedure, and follow up care after device is implanted. We also discussed remote monitoring in details. Patient is in agreement with proceeding with the device implant. We discussed the risks of bleeding, hematoma, injury to vessels and heart, perforation, tamponade, pneumothorax, infection, lead dislodgment, device malfunction, and rare risks of stroke/heart attack/death. Patient expressed understanding of the discussion. I answered all the questions in details.

## 2023-11-10 NOTE — PROGRESS NOTE ADULT - ASSESSMENT
68-year-old female with past medical history of hypertension hyperlipidemia presents to the ED with chest pain and shortness of breath.     #Symptomatic Bradycardia  #Possible Vagal Reaction after Vomiting vs Underlying Cardiac Disease  - Ti 30 with EMS and received 1 mg of Atropine  - Currently HR 60  - Stop BB and Non DH CCB  - Trop 0.01 and Dimer 480  - Check Lyme titers and TFTs  - EP recommendations  # Sinus bradycardia  # possible sick sinus syndrome/chronotropic incompetency  # HTN / HLD  - Pt was still bradycardic on Dopamine gtt  - not on otf blockers at home   - Echo with normal EF  - plan for DC PPM today with Dr. Burgess ([facility])      #HLD  #HTN  - c/w Home meds (family will bring them tomorrow)  - Check A1c,  and lipids      DVTppx: Lovenox  GI ppx: Famotidine  Diet: DASH  Dispo: MED TELE    Plan  - Chest Xray tomorrow morning PA/LATERAL  - NPO after midnight for EP interrogation  - Patient started on a bowel regimen

## 2023-11-10 NOTE — PROGRESS NOTE ADULT - ASSESSMENT
68-year-old female with past medical history of hypertension hyperlipidemia presents to the ED with chest pain associated with nausea and 2 episodes of nb/nb vomiting. EMS reports patient was bradycardic approximately 38 bpm with hypotension 80s/50s.  EMS administered atropine 1 mg with improvement in heart rate to 60s bpm, and was given IV fluids en route with improvement in blood pressure as well. Per the son the patient had a similar episode few weeks ago but didn't seek medical care    # Sinus bradycardia  # possible sick sinus syndrome  # HTN / HLD    - Pt still bradycardic on Dopamine gtt  - not on otf blockers at home   - Echo with normal EF  - plan for PPM today with Dr. Burgess  - Frank pt NPO  - Pt's PCP is Bryon West [phone] ([phone]) 68-year-old female with past medical history of hypertension hyperlipidemia presents to the ED with chest pain associated with nausea and 2 episodes of nb/nb vomiting. EMS reports patient was bradycardic approximately 38 bpm with hypotension 80s/50s.  EMS administered atropine 1 mg with improvement in heart rate to 60s bpm, and was given IV fluids en route with improvement in blood pressure as well. Per the son the patient had a similar episode few weeks ago but didn't seek medical care    # Sinus bradycardia  # possible sick sinus syndrome  # HTN / HLD    - Pt still bradycardic on Dopamine gtt  - not on otf blockers at home   - Echo with normal EF  - plan for DC PPM today with Dr. Burgess ([facility])  - Keep pt NPO  - Pt's PCP is Bryon West [phone] ([phone])

## 2023-11-11 ENCOUNTER — TRANSCRIPTION ENCOUNTER (OUTPATIENT)
Age: 68
End: 2023-11-11

## 2023-11-11 VITALS
RESPIRATION RATE: 28 BRPM | DIASTOLIC BLOOD PRESSURE: 81 MMHG | TEMPERATURE: 98 F | SYSTOLIC BLOOD PRESSURE: 136 MMHG | HEART RATE: 60 BPM

## 2023-11-11 LAB
ALBUMIN SERPL ELPH-MCNC: 3.9 G/DL — SIGNIFICANT CHANGE UP (ref 3.5–5.2)
ALBUMIN SERPL ELPH-MCNC: 3.9 G/DL — SIGNIFICANT CHANGE UP (ref 3.5–5.2)
ALP SERPL-CCNC: 75 U/L — SIGNIFICANT CHANGE UP (ref 30–115)
ALP SERPL-CCNC: 75 U/L — SIGNIFICANT CHANGE UP (ref 30–115)
ALT FLD-CCNC: 18 U/L — SIGNIFICANT CHANGE UP (ref 0–41)
ALT FLD-CCNC: 18 U/L — SIGNIFICANT CHANGE UP (ref 0–41)
ANION GAP SERPL CALC-SCNC: 10 MMOL/L — SIGNIFICANT CHANGE UP (ref 7–14)
ANION GAP SERPL CALC-SCNC: 10 MMOL/L — SIGNIFICANT CHANGE UP (ref 7–14)
AST SERPL-CCNC: 20 U/L — SIGNIFICANT CHANGE UP (ref 0–41)
AST SERPL-CCNC: 20 U/L — SIGNIFICANT CHANGE UP (ref 0–41)
BILIRUB SERPL-MCNC: 0.6 MG/DL — SIGNIFICANT CHANGE UP (ref 0.2–1.2)
BILIRUB SERPL-MCNC: 0.6 MG/DL — SIGNIFICANT CHANGE UP (ref 0.2–1.2)
BUN SERPL-MCNC: 20 MG/DL — SIGNIFICANT CHANGE UP (ref 10–20)
BUN SERPL-MCNC: 20 MG/DL — SIGNIFICANT CHANGE UP (ref 10–20)
CALCIUM SERPL-MCNC: 9 MG/DL — SIGNIFICANT CHANGE UP (ref 8.4–10.5)
CALCIUM SERPL-MCNC: 9 MG/DL — SIGNIFICANT CHANGE UP (ref 8.4–10.5)
CHLORIDE SERPL-SCNC: 104 MMOL/L — SIGNIFICANT CHANGE UP (ref 98–110)
CHLORIDE SERPL-SCNC: 104 MMOL/L — SIGNIFICANT CHANGE UP (ref 98–110)
CO2 SERPL-SCNC: 27 MMOL/L — SIGNIFICANT CHANGE UP (ref 17–32)
CO2 SERPL-SCNC: 27 MMOL/L — SIGNIFICANT CHANGE UP (ref 17–32)
CREAT SERPL-MCNC: 0.7 MG/DL — SIGNIFICANT CHANGE UP (ref 0.7–1.5)
CREAT SERPL-MCNC: 0.7 MG/DL — SIGNIFICANT CHANGE UP (ref 0.7–1.5)
EGFR: 94 ML/MIN/1.73M2 — SIGNIFICANT CHANGE UP
EGFR: 94 ML/MIN/1.73M2 — SIGNIFICANT CHANGE UP
GLUCOSE SERPL-MCNC: 108 MG/DL — HIGH (ref 70–99)
GLUCOSE SERPL-MCNC: 108 MG/DL — HIGH (ref 70–99)
HCT VFR BLD CALC: 36.9 % — LOW (ref 37–47)
HCT VFR BLD CALC: 36.9 % — LOW (ref 37–47)
HGB BLD-MCNC: 12.2 G/DL — SIGNIFICANT CHANGE UP (ref 12–16)
HGB BLD-MCNC: 12.2 G/DL — SIGNIFICANT CHANGE UP (ref 12–16)
MAGNESIUM SERPL-MCNC: 2.3 MG/DL — SIGNIFICANT CHANGE UP (ref 1.8–2.4)
MAGNESIUM SERPL-MCNC: 2.3 MG/DL — SIGNIFICANT CHANGE UP (ref 1.8–2.4)
MCHC RBC-ENTMCNC: 30.7 PG — SIGNIFICANT CHANGE UP (ref 27–31)
MCHC RBC-ENTMCNC: 30.7 PG — SIGNIFICANT CHANGE UP (ref 27–31)
MCHC RBC-ENTMCNC: 33.1 G/DL — SIGNIFICANT CHANGE UP (ref 32–37)
MCHC RBC-ENTMCNC: 33.1 G/DL — SIGNIFICANT CHANGE UP (ref 32–37)
MCV RBC AUTO: 92.9 FL — SIGNIFICANT CHANGE UP (ref 81–99)
MCV RBC AUTO: 92.9 FL — SIGNIFICANT CHANGE UP (ref 81–99)
NRBC # BLD: 0 /100 WBCS — SIGNIFICANT CHANGE UP (ref 0–0)
NRBC # BLD: 0 /100 WBCS — SIGNIFICANT CHANGE UP (ref 0–0)
PLATELET # BLD AUTO: 141 K/UL — SIGNIFICANT CHANGE UP (ref 130–400)
PLATELET # BLD AUTO: 141 K/UL — SIGNIFICANT CHANGE UP (ref 130–400)
PMV BLD: 10.9 FL — HIGH (ref 7.4–10.4)
PMV BLD: 10.9 FL — HIGH (ref 7.4–10.4)
POTASSIUM SERPL-MCNC: 3.4 MMOL/L — LOW (ref 3.5–5)
POTASSIUM SERPL-MCNC: 3.4 MMOL/L — LOW (ref 3.5–5)
POTASSIUM SERPL-SCNC: 3.4 MMOL/L — LOW (ref 3.5–5)
POTASSIUM SERPL-SCNC: 3.4 MMOL/L — LOW (ref 3.5–5)
PROT SERPL-MCNC: 6.6 G/DL — SIGNIFICANT CHANGE UP (ref 6–8)
PROT SERPL-MCNC: 6.6 G/DL — SIGNIFICANT CHANGE UP (ref 6–8)
RBC # BLD: 3.97 M/UL — LOW (ref 4.2–5.4)
RBC # BLD: 3.97 M/UL — LOW (ref 4.2–5.4)
RBC # FLD: 12.3 % — SIGNIFICANT CHANGE UP (ref 11.5–14.5)
RBC # FLD: 12.3 % — SIGNIFICANT CHANGE UP (ref 11.5–14.5)
SODIUM SERPL-SCNC: 141 MMOL/L — SIGNIFICANT CHANGE UP (ref 135–146)
SODIUM SERPL-SCNC: 141 MMOL/L — SIGNIFICANT CHANGE UP (ref 135–146)
WBC # BLD: 7 K/UL — SIGNIFICANT CHANGE UP (ref 4.8–10.8)
WBC # BLD: 7 K/UL — SIGNIFICANT CHANGE UP (ref 4.8–10.8)
WBC # FLD AUTO: 7 K/UL — SIGNIFICANT CHANGE UP (ref 4.8–10.8)
WBC # FLD AUTO: 7 K/UL — SIGNIFICANT CHANGE UP (ref 4.8–10.8)

## 2023-11-11 PROCEDURE — 71046 X-RAY EXAM CHEST 2 VIEWS: CPT | Mod: 26

## 2023-11-11 PROCEDURE — 99238 HOSP IP/OBS DSCHRG MGMT 30/<: CPT

## 2023-11-11 PROCEDURE — 99233 SBSQ HOSP IP/OBS HIGH 50: CPT

## 2023-11-11 RX ORDER — ATORVASTATIN CALCIUM 80 MG/1
1 TABLET, FILM COATED ORAL
Refills: 0 | DISCHARGE

## 2023-11-11 RX ORDER — ATORVASTATIN CALCIUM 80 MG/1
1 TABLET, FILM COATED ORAL
Qty: 0 | Refills: 0 | DISCHARGE
Start: 2023-11-11

## 2023-11-11 RX ADMIN — CHLORHEXIDINE GLUCONATE 1 APPLICATION(S): 213 SOLUTION TOPICAL at 06:12

## 2023-11-11 RX ADMIN — POLYETHYLENE GLYCOL 3350 17 GRAM(S): 17 POWDER, FOR SOLUTION ORAL at 06:00

## 2023-11-11 RX ADMIN — Medication 650 MILLIGRAM(S): at 00:28

## 2023-11-11 RX ADMIN — Medication 100 MILLIGRAM(S): at 05:59

## 2023-11-11 NOTE — DISCHARGE NOTE PROVIDER - PROVIDER TOKENS
PROVIDER:[TOKEN:[[id number]],FOLLOWUP:[1 month]] PROVIDER:[TOKEN:[[id number]],FOLLOWUP:[1 month]],PROVIDER:[TOKEN:[[id number]],FOLLOWUP:[1 month]]

## 2023-11-11 NOTE — PROGRESS NOTE ADULT - SUBJECTIVE AND OBJECTIVE BOX
24H events:    Patient is a 68y old Female who presents with a chief complaint of Bradycardia and Chest Pain (10 Nov 2023 09:39)    Primary diagnosis of Bradycardia    Today is hospital day 2d. This morning patient was seen and examined at bedside. Patient was still constipated and nauseous this morning.  Yesterday, the HR improved after Atropine administration (ECG in the chart)  Was still bradycardic this morning, HR in the 30-40 despite being on Dopamine    Code Status: Full code    PAST MEDICAL & SURGICAL HISTORY  Hypertension  Hyperlipidemia    ALLERGIES:  No Known Allergies    MEDICATIONS:  STANDING MEDICATIONS  atorvastatin 20 milliGRAM(s) Oral at bedtime  ceFAZolin   IVPB 1000 milliGRAM(s) IV Intermittent every 8 hours  chlorhexidine 2% Cloths 1 Application(s) Topical <User Schedule>  polyethylene glycol 3350 17 Gram(s) Oral two times a day    PRN MEDICATIONS  acetaminophen     Tablet .. 650 milliGRAM(s) Oral every 6 hours PRN  aluminum hydroxide/magnesium hydroxide/simethicone Suspension 30 milliLiter(s) Oral every 4 hours PRN  melatonin 3 milliGRAM(s) Oral at bedtime PRN  ondansetron Injectable 4 milliGRAM(s) IV Push every 8 hours PRN    VITALS:   Vital Signs Last 24 Hrs  T(C): 37.1 (10 Nov 2023 12:45), Max: 37.1 (10 Nov 2023 12:30)  T(F): 98.8 (10 Nov 2023 12:45), Max: 98.8 (10 Nov 2023 12:30)  HR: 60 (10 Nov 2023 14:00) (37 - 60)  BP: 116/66 (10 Nov 2023 14:00) (96/62 - 136/80)  BP(mean): 86 (10 Nov 2023 14:00) (80 - 101)  RR: 20 (10 Nov 2023 14:00) (13 - 24)  SpO2: 98% (10 Nov 2023 14:00) (96% - 99%)    Parameters below as of 10 Nov 2023 14:00  Patient On (Oxygen Delivery Method): room air    PHYSICAL EXAM:  GENERAL: NAD, lying in bed, somnolent, cooperative, elderly  NECK: Supple, no neck stiffness/nuchal rigidity, no JVD    HEART: Regular rate and rhythm, normal S1/S2, no murmurs, heaves, thrills  LUNGS: No acute respiratory distress, clear b/l breath sounds, no wheezing, rales, rhonchi  ABDOMEN:  soft, non-tender, non-distented   EXTREMITIES: no rashes, no cyanosis, no edema, ulcerations or ecchymosis  SKIN: No rashes or lesions       [facility] score: 20-22    LABS:                        13.0   8.12  )-----------( 175      ( 10 Nov 2023 05:32 )             38.9     11-10    140  |  104  |  17  ----------------------------<  133<H>  3.6   |  25  |  0.6<L>    Ca    9.2      10 Nov 2023 05:32  Phos  3.6     11-09  Mg     2.3     11-10    TPro  7.3  /  Alb  4.2  /  TBili  0.7  /  DBili  x   /  AST  21  /  ALT  21  /  AlkPhos  77  11-10    PT/INR - ( 09 Nov 2023 07:23 )   PT: 11.20 sec;   INR: 0.98 ratio         PTT - ( 09 Nov 2023 07:23 )  PTT:28.1 sec  Urinalysis Basic - ( 10 Nov 2023 05:32 )    Color: x / Appearance: x / SG: x / pH: x  Gluc: 133 mg/dL / Ketone: x  / Bili: x / Urobili: x   Blood: x / Protein: x / Nitrite: x   Leuk Esterase: x / RBC: x / WBC x   Sq Epi: x / Non Sq Epi: x / Bacteria: x      Lactate, Blood: 0.8 mmol/L (11-10-23 @ 01:16)    CARDIAC MARKERS ( 08 Nov 2023 22:10 )  x     / <0.01 ng/mL / x     / x     / x          RADIOLOGY:  XR CHEST PORTABLE IMMED 1V PROCEDURE DATE:  11/10/2023      INTERPRETATION:  Clinical History / Reason for exam: Pacemaker closing shot    Comparison : Chest radiograph 11/8/2023.    Technique/Positioning: Single AP supine radiograph of the chest.    Findings:    Support devices: Interval placement of left chest pacemaker, with leads terminating in the area of the right atrium and right ventricle.    Cardiac/mediastinum/hilum: Unchanged    Lung parenchyma/Pleura: Trace left basilar effusion. No pneumothorax.    Skeleton/soft tissues: Unchanged    Impression:    Interval placement of left chest pacemaker, with leads terminating in the area of the right atrium and right ventricle.    Trace left basilar effusion.    No pneumothorax.    2D Echo/Doppler/Color Doppler Complete and Echocardiogram with Definity Contrast on 11/10/2023 Summary:   1. Normal global leftventricular systolic function.   2. LV Ejection Fraction by Dalal's Method with a biplane EF of 68 %.   3. Moderate concentric left ventricular hypertrophy.   4. Normal right ventricular size and function.   5. Mild tricuspid regurgitation.   6. Normal pulmonary artery pressure.   7. Endocardial visualization was enhanced with intravenous echo contrast.
INTERVAL HPI/OVERNIGHT EVENTS:  Son at bedside for translation.   Pt is POD #1 PPM by Dr. Burgess. No acute events over night. Currently A paced V sensed 60 bpm. No tele events noted. Pressure dressing removed.   Patient denies fever, chills, dizziness, syncope, chest pain, palpitations, SOB, cough, abd pain, n/v/d/c, dysuria, hematuria or unusual rash.     MEDICATIONS  (STANDING):  atorvastatin 20 milliGRAM(s) Oral at bedtime  chlorhexidine 2% Cloths 1 Application(s) Topical <User Schedule>  polyethylene glycol 3350 17 Gram(s) Oral two times a day  senna 2 Tablet(s) Oral at bedtime    MEDICATIONS  (PRN):  acetaminophen     Tablet .. 650 milliGRAM(s) Oral every 6 hours PRN Temp greater or equal to 38C (100.4F), Mild Pain (1 - 3)  aluminum hydroxide/magnesium hydroxide/simethicone Suspension 30 milliLiter(s) Oral every 4 hours PRN Dyspepsia  melatonin 3 milliGRAM(s) Oral at bedtime PRN Insomnia  ondansetron Injectable 4 milliGRAM(s) IV Push every 8 hours PRN Nausea and/or Vomiting      Allergies  No Known Allergies      REVIEW OF SYSTEMS    [x] A ten-point review of systems was otherwise negative except as noted.  [ ] Due to altered mental status/intubation, subjective information were not able to be obtained from the patient. History was obtained, to the extent possible, from review of the chart and collateral sources of information.      Vital Signs Last 24 Hrs  T(C): 36.7 (11 Nov 2023 07:27), Max: 37.1 (10 Nov 2023 12:30)  T(F): 98.1 (11 Nov 2023 07:27), Max: 98.8 (10 Nov 2023 12:30)  HR: 60 (11 Nov 2023 07:27) (60 - 60)  BP: 136/81 (11 Nov 2023 07:27) (96/62 - 136/81)  BP(mean): 103 (11 Nov 2023 07:27) (86 - 103)  RR: 28 (11 Nov 2023 07:27) (14 - 28)  SpO2: 98% (10 Nov 2023 20:00) (96% - 98%)    Parameters below as of 10 Nov 2023 20:00  Patient On (Oxygen Delivery Method): room air        11-10-23 @ 07:01  -  11-11-23 @ 07:00  --------------------------------------------------------  IN: 370 mL / OUT: 0 mL / NET: 370 mL        Physical Exam  GENERAL: In no apparent distress, well nourished, and hydrated.  HEART: Regular rate and rhythm; No murmurs, rubs, or gallops.  PULMONARY: Clear to auscultation and perfusion.  No rales, wheezing, or rhonchi bilaterally.  CHEST: Left chest wall surgical dressing c/d/i. No abnormal drainage Mild swelling.   ABDOMEN: Soft, Nontender, Nondistended; Bowel sounds present  EXTREMITIES:  2+ Peripheral Pulses, No clubbing, cyanosis, or edema  NEUROLOGICAL: AO x4 ,GARCIA, speech clear    LABS:                        12.2   7.00  )-----------( 141      ( 11 Nov 2023 04:54 )             36.9     11-11    141  |  104  |  20  ----------------------------<  108<H>  3.4<L>   |  27  |  0.7    Ca    9.0      11 Nov 2023 04:54  Mg     2.3     11-11    TPro  6.6  /  Alb  3.9  /  TBili  0.6  /  DBili  x   /  AST  20  /  ALT  18  /  AlkPhos  75  11-11      Urinalysis Basic - ( 11 Nov 2023 04:54 )    Color: x / Appearance: x / SG: x / pH: x  Gluc: 108 mg/dL / Ketone: x  / Bili: x / Urobili: x   Blood: x / Protein: x / Nitrite: x   Leuk Esterase: x / RBC: x / WBC x   Sq Epi: x / Non Sq Epi: x / Bacteria: x        11-10-23 @ 07:01  -  11-11-23 @ 07:00  --------------------------------------------------------  IN: 370 mL / OUT: 0 mL / NET: 370 mL        11-10-23 @ 07:01  -  11-11-23 @ 07:00  --------------------------------------------------------  IN: 370 mL / OUT: 0 mL / NET: 370 mL      RADIOLOGY:  < from: TTE Echo Complete w/ Contrast w/ Doppler (11.09.23 @ 12:14) >  Summary:   1. Normal global leftventricular systolic function.   2. LV Ejection Fraction by Dalal's Method with a biplane EF of 68 %.   3. Moderate concentric left ventricular hypertrophy.   4. Normal right ventricular size and function.   5. Mild tricuspid regurgitation.   6. Normal pulmonary artery pressure.   7. Endocardial visualization was enhanced with intravenous echo contrast.    PHYSICIAN INTERPRETATION:  Left Ventricle: Endocardial visualization was enhanced with intravenous   echo contrast. The left ventricular internal cavity size is normal. Left   ventricular wall thickness is increased. There is moderate concentric   left ventricular hypertrophy. Global LV systolic function was normal.   Spectral Doppler shows normal pattern of LV diastolic filling. Normal LV   filling pressures.      LV Wall Scoring:  All segments are normal.    Right Ventricle: Normal right ventricular size and function.  Left Atrium: Normal left atrial size.  Right Atrium: Normal right atrial size.  Pericardium: Trivial pericardial effusion is present.  Mitral Valve: Structurally normal mitral valve, with normal leaflet   excursion. No evidence of mitral stenosis. Trace mitral valve   regurgitation is seen.  Tricuspid Valve: Structurally normal tricuspid valve, with normal leaflet  excursion. Mild tricuspid regurgitation is visualized.  Aortic Valve: Normal trileaflet aortic valve with normal opening. No   evidence of aortic stenosis. No evidence of aortic valve regurgitation is   seen.  Pulmonic Valve: Structurally normal pulmonic valve, with normal leaflet   excursion. Trace pulmonic valve regurgitation.  Aorta: The aortic root and ascending aorta are structurally normal, with   no evidence of dilitation.  Pulmonary Artery: Normal pulmonary artery pressure.  Venous: Theinferior vena cava was normal sized, with respiratory size   variation greater than 50%.  In comparison to the previous echocardiogram(s): There are no prior   studies on this patient for comparison purposes.    < end of copied text >  
INTERVAL HPI/OVERNIGHT EVENTS:  Pt still bradycardic on Dopamine gtt.  HR is in the mid 30s-high 40s.  Pt with dizziness    MEDICATIONS  (STANDING):  atorvastatin 20 milliGRAM(s) Oral at bedtime  chlorhexidine 2% Cloths 1 Application(s) Topical <User Schedule>  DOPamine Infusion 10 MICROgram(s)/kG/Min (26.4 mL/Hr) IV Continuous <Continuous>  polyethylene glycol 3350 17 Gram(s) Oral two times a day    MEDICATIONS  (PRN):  acetaminophen     Tablet .. 650 milliGRAM(s) Oral every 6 hours PRN Temp greater or equal to 38C (100.4F), Mild Pain (1 - 3)  aluminum hydroxide/magnesium hydroxide/simethicone Suspension 30 milliLiter(s) Oral every 4 hours PRN Dyspepsia  melatonin 3 milliGRAM(s) Oral at bedtime PRN Insomnia  ondansetron Injectable 4 milliGRAM(s) IV Push every 8 hours PRN Nausea and/or Vomiting      Allergies    No Known Allergies    Intolerances      REVIEW OF SYSTEMS    as above    Vital Signs Last 24 Hrs  T(C): 36.7 (10 Nov 2023 09:07), Max: 36.8 (10 Nov 2023 00:00)  T(F): 98 (10 Nov 2023 09:07), Max: 98.3 (10 Nov 2023 00:00)  HR: 46 (10 Nov 2023 09:07) (37 - 60)  BP: 136/69 (10 Nov 2023 09:07) (109/59 - 140/72)  BP(mean): 97 (10 Nov 2023 07:00) (80 - 100)  RR: 21 (10 Nov 2023 09:07) (13 - 30)  SpO2: 98% (10 Nov 2023 09:07) (97% - 99%)    Parameters below as of 10 Nov 2023 07:00  Patient On (Oxygen Delivery Method): nasal cannula        11-09-23 @ 07:01  -  11-10-23 @ 07:00  --------------------------------------------------------  IN: 539.9 mL / OUT: 850 mL / NET: -310.1 mL        Physical Exam    GENERAL: In no apparent distress, well nourished, and hydrated.  HEART: Regular rhythm akshat at 44 BPM. ; No murmurs, rubs, or gallops.  PULMONARY: Clear to auscultation and perfusion.  No rales, wheezing, or rhonchi bilaterally.  ABDOMEN: Soft, Nontender, Nondistended; Bowel sounds present  EXTREMITIES:  2+ Peripheral Pulses, No clubbing, cyanosis, or edema  NEUROLOGICAL: Grossly nonfocal    LABS:                        13.0   8.12  )-----------( 175      ( 10 Nov 2023 05:32 )             38.9     11-10    140  |  104  |  17  ----------------------------<  133<H>  3.6   |  25  |  0.6<L>    Ca    9.2      10 Nov 2023 05:32  Phos  3.6     11-09  Mg     2.3     11-10    TPro  7.3  /  Alb  4.2  /  TBili  0.7  /  DBili  x   /  AST  21  /  ALT  21  /  AlkPhos  77  11-10    PT/INR - ( 09 Nov 2023 07:23 )   PT: 11.20 sec;   INR: 0.98 ratio         PTT - ( 09 Nov 2023 07:23 )  PTT:28.1 sec  Urinalysis Basic - ( 10 Nov 2023 05:32 )    Color: x / Appearance: x / SG: x / pH: x  Gluc: 133 mg/dL / Ketone: x  / Bili: x / Urobili: x   Blood: x / Protein: x / Nitrite: x   Leuk Esterase: x / RBC: x / WBC x   Sq Epi: x / Non Sq Epi: x / Bacteria: x      RADIOLOGY & ADDITIONAL TESTS:  < from: TTE Echo Complete w/ Contrast w/ Doppler (11.09.23 @ 12:14) >  Summary:   1. Normal global leftventricular systolic function.   2. LV Ejection Fraction by Dalal's Method with a biplane EF of 68 %.   3. Moderate concentric left ventricular hypertrophy.   4. Normal right ventricular size and function.   5. Mild tricuspid regurgitation.   6. Normal pulmonary artery pressure.   7. Endocardial visualization was enhanced with intravenous echo contrast.    < end of copied text >

## 2023-11-11 NOTE — DISCHARGE NOTE PROVIDER - NSDCMRMEDTOKEN_GEN_ALL_CORE_FT
amLODIPine 5 mg oral tablet: 1 tab(s) orally once a day  atorvastatin 20 mg oral tablet: 1 tab(s) orally once a day (at bedtime)  Hyzaar 100 mg-12.5 mg oral tablet: 1 tab(s) orally once a day

## 2023-11-11 NOTE — DISCHARGE NOTE PROVIDER - HOSPITAL COURSE
HPI:   68-year-old female with past medical history of hypertension hyperlipidemia presents to the ED with chest pain and shortness of breath.  Per family patient was doing well earlier today and at baseline, ate dinner, and shortly afterwards developed chest pain associated with nausea and 2 episodes of nb/nb vomiting. Family became concerned and EMS was called.  EMS reports patient was bradycardic approximately 38 bpm with hypotension 80s/50s.  EMS administered atropine 1 mg with improvement in heart rate to 60s bpm, and was given IV fluids en route with improvement in blood pressure as well. Per the son the patient had a similar episode few weeks ago but didn't seek medical care    In the ED , BP and HR 60 with ECG showing sinus.  Labs unremarkable, D-dimer 480 , Trop 0.01.  CXR with no consolidations    Patient was diagnosed with symptomatic bradycardia likely due to sick sinu syndrome after other causes for sinus bradycardia werre ruled out.    A dual chamber PPM was implanted with out any complications. CXR post PPM shows leads in good position. Device was interrogated and found to be functioning properly.  patient was educated about her condition and discharged home. She will follow up as outpt. HPI:   68-year-old female with past medical history of hypertension hyperlipidemia presents to the ED with chest pain and shortness of breath.  Per family patient was doing well earlier today and at baseline, ate dinner, and shortly afterwards developed chest pain associated with nausea and 2 episodes of nb/nb vomiting. Family became concerned and EMS was called.  EMS reports patient was bradycardic approximately 38 bpm with hypotension 80s/50s.  EMS administered atropine 1 mg with improvement in heart rate to 60s bpm, and was given IV fluids en route with improvement in blood pressure as well. Per the son the patient had a similar episode few weeks ago but didn't seek medical care    In the ED , BP and HR 60 with ECG showing sinus.  Labs unremarkable, D-dimer 480 , Trop 0.01.  CXR with no consolidations    Patient was diagnosed with symptomatic bradycardia likely due to chronotropic incompetence after other causes for sinus bradycardia were ruled out.  She required IV Dopamine for symptomatic bradycardia.  A dual chamber PPM was implanted with out any complications. CXR post PPM shows leads stable and in good position. Device was interrogated and found to be functioning properly.  Patient was educated about her condition and discharged home. She will follow up as outpt.

## 2023-11-11 NOTE — DISCHARGE NOTE PROVIDER - CARE PROVIDER_API CALL
Bryon Garcia  Internal Medicine  JORGE A BOND, Phys,    Phone: ([phone]  Fax: ()-  Follow Up Time: 1 month   Bryon Garcia  Internal Medicine  N  YIN BOND, Estephania,    Phone: ()-  Fax: ()-  Follow Up Time: 1 month    Andrae Garcia  Cardiovascular Disease  [address] [place] [address], [address] [address]  Phone: [phone]  Fax: [phone]  Follow Up Time: 1 month

## 2023-11-11 NOTE — PROGRESS NOTE ADULT - ASSESSMENT
EP: Audrey    68-year-old female with past medical history of hypertension hyperlipidemia presents to the ED with chest pain associated with nausea and 2 episodes of nb/nb vomiting. EMS reports patient was bradycardic approximately 38 bpm with hypotension 80s/50s.  EMS administered atropine 1 mg with improvement in heart rate to 60s bpm. Admitted with SSS, started on dopamine gtt. 11/10/2023 pt s/p PPM ([facility]) with Dr. Burgess. No immediate postop complications noted.     Impression:  Sinus bradycardia/sick sinus syndrome  Hx HTN / HLD    Plan:  - Interrogation complete: No events. Normal functioning device.   - CXR noted  - HOLD any heparin, lovenox or DOACs for 48 hr following procedure to minimize risk of hematoma  - May resume PO diet  - Cont current care  - Tylenol for pain  - Ambulate  - Wound care per CT surgery  - Outpatient fu in 1 month with Dr. Ventura    Discharge Instructions:  - No heavy lifting > 5 lbs. for 4-6 weeks  - Do not raise your arm above shoulder level for 4-6 weeks.   - No submerging in water for 1 month  - Leave steri-strips in place, they will fall off on their own  - No driving for 1 week        - Pt's PCP is Bryon West [phone] ([address]) EP: Audrey    68-year-old female with past medical history of hypertension hyperlipidemia presents to the ED with chest pain associated with nausea and 2 episodes of nb/nb vomiting. EMS reports patient was bradycardic approximately 38 bpm with hypotension 80s/50s.  EMS administered atropine 1 mg with improvement in heart rate to 60s bpm. Admitted with SSS, started on dopamine gtt. 11/10/2023 pt s/p PPM ([facility]) with Dr. Burgess. No immediate postop complications noted.     Impression:  Sinus bradycardia/sick sinus syndrome  Hx HTN / HLD    Plan:  - Interrogation complete: No events. Normal functioning device. DDD 60-120BPM  - CXR noted  - HOLD any heparin, lovenox or DOACs for 48 hr following procedure to minimize risk of hematoma  - May resume PO diet  - Cont current care  - Tylenol for pain  - Ambulate  - Wound care per CT surgery  - Outpatient fu in 1 month with Dr. Ventura    Discharge Instructions:  - No heavy lifting > 5 lbs. for 4-6 weeks  - Do not raise your arm above shoulder level for 4-6 weeks.   - No submerging in water for 1 month  - Leave steri-strips in place, they will fall off on their own  - No driving for 1 week        - Pt's PCP is Bryon West [phone] ([phone])

## 2023-11-11 NOTE — DISCHARGE NOTE PROVIDER - ATTENDING DISCHARGE PHYSICAL EXAMINATION:
PPM site evaluated at discharge. Mild swelling noted. Discussed with EP.  Stable for discharge with close cardiology follow up.

## 2023-11-11 NOTE — DISCHARGE NOTE PROVIDER - CARE PROVIDERS DIRECT ADDRESSES
,DirectAddress_Unknown ,DirectAddress_Unknown,[email or web address]

## 2023-11-11 NOTE — DISCHARGE NOTE PROVIDER - NSDCFUADDINST_GEN_ALL_CORE_FT
- No heavy lifting > 5 lbs. for 4-6 weeks  - Do not raise your arm above shoulder level for 4-6 weeks.   - No submerging in water for 1 month  - Leave steri-strips in place, they will fall off on their own  - No driving for 1 week

## 2023-11-11 NOTE — DISCHARGE NOTE NURSING/CASE MANAGEMENT/SOCIAL WORK - PATIENT PORTAL LINK FT
You can access the FollowMyHealth Patient Portal offered by [facility] by registering at the following website: [email or web address]. By joining [employer]’s FollowMyHealth portal, you will also be able to view your health information using other applications (apps) compatible with our system.

## 2023-11-11 NOTE — DISCHARGE NOTE PROVIDER - NSDCFUSCHEDAPPT_GEN_ALL_CORE_FT
Quinten Burgess  [facility]  CTSURG [address] [facility]  Scheduled Appointment: 11/15/2023

## 2023-11-11 NOTE — DISCHARGE NOTE PROVIDER - NSDCCPCAREPLAN_GEN_ALL_CORE_FT
PRINCIPAL DISCHARGE DIAGNOSIS  Diagnosis: Bradycardia  Assessment and Plan of Treatment: You presented for bradycardia (slow heart rate) causing you to occasionally lose conciousness. The bradycardia was treated with pacemaker that was implanted to prevent the recurrence of symptoms      SECONDARY DISCHARGE DIAGNOSES  Diagnosis: Chest pain  Assessment and Plan of Treatment:

## 2023-11-15 ENCOUNTER — APPOINTMENT (OUTPATIENT)
Dept: CARDIOTHORACIC SURGERY | Facility: CLINIC | Age: 68
End: 2023-11-15
Payer: MEDICARE

## 2023-11-15 VITALS
DIASTOLIC BLOOD PRESSURE: 74 MMHG | TEMPERATURE: 97.5 F | RESPIRATION RATE: 12 BRPM | HEART RATE: 60 BPM | OXYGEN SATURATION: 98 % | SYSTOLIC BLOOD PRESSURE: 106 MMHG

## 2023-11-15 PROBLEM — Z00.00 ENCOUNTER FOR PREVENTIVE HEALTH EXAMINATION: Status: ACTIVE | Noted: 2023-11-15

## 2023-11-15 PROCEDURE — 99024 POSTOP FOLLOW-UP VISIT: CPT

## 2023-11-16 ENCOUNTER — RESULT REVIEW (OUTPATIENT)
Age: 68
End: 2023-11-16

## 2023-11-16 ENCOUNTER — EMERGENCY (EMERGENCY)
Facility: HOSPITAL | Age: 68
LOS: 0 days | Discharge: HOME | End: 2023-11-16
Attending: EMERGENCY MEDICINE
Payer: MEDICARE

## 2023-11-16 ENCOUNTER — OUTPATIENT (OUTPATIENT)
Dept: OUTPATIENT SERVICES | Facility: HOSPITAL | Age: 68
LOS: 1 days | End: 2023-11-16
Payer: MEDICARE

## 2023-11-16 VITALS
HEART RATE: 60 BPM | OXYGEN SATURATION: 100 % | TEMPERATURE: 98 F | RESPIRATION RATE: 20 BRPM | SYSTOLIC BLOOD PRESSURE: 110 MMHG | WEIGHT: 149.91 LBS | DIASTOLIC BLOOD PRESSURE: 67 MMHG

## 2023-11-16 VITALS
RESPIRATION RATE: 18 BRPM | DIASTOLIC BLOOD PRESSURE: 73 MMHG | OXYGEN SATURATION: 99 % | HEART RATE: 60 BPM | SYSTOLIC BLOOD PRESSURE: 121 MMHG

## 2023-11-16 DIAGNOSIS — Z95.0 PRESENCE OF CARDIAC PACEMAKER: ICD-10-CM

## 2023-11-16 DIAGNOSIS — I82.401 ACUTE EMBOLISM AND THROMBOSIS OF UNSPECIFIED DEEP VEINS OF RIGHT LOWER EXTREMITY: ICD-10-CM

## 2023-11-16 DIAGNOSIS — I10 ESSENTIAL (PRIMARY) HYPERTENSION: ICD-10-CM

## 2023-11-16 DIAGNOSIS — Z00.8 ENCOUNTER FOR OTHER GENERAL EXAMINATION: ICD-10-CM

## 2023-11-16 DIAGNOSIS — M79.604 PAIN IN RIGHT LEG: ICD-10-CM

## 2023-11-16 DIAGNOSIS — E78.5 HYPERLIPIDEMIA, UNSPECIFIED: ICD-10-CM

## 2023-11-16 DIAGNOSIS — Z95.0 PRESENCE OF CARDIAC PACEMAKER: Chronic | ICD-10-CM

## 2023-11-16 LAB
ANION GAP SERPL CALC-SCNC: 11 MMOL/L — SIGNIFICANT CHANGE UP (ref 7–14)
ANION GAP SERPL CALC-SCNC: 11 MMOL/L — SIGNIFICANT CHANGE UP (ref 7–14)
BASOPHILS # BLD AUTO: 0.04 K/UL — SIGNIFICANT CHANGE UP (ref 0–0.2)
BASOPHILS # BLD AUTO: 0.04 K/UL — SIGNIFICANT CHANGE UP (ref 0–0.2)
BASOPHILS NFR BLD AUTO: 0.6 % — SIGNIFICANT CHANGE UP (ref 0–1)
BASOPHILS NFR BLD AUTO: 0.6 % — SIGNIFICANT CHANGE UP (ref 0–1)
CO2 SERPL-SCNC: 27 MMOL/L — SIGNIFICANT CHANGE UP (ref 17–32)
CO2 SERPL-SCNC: 27 MMOL/L — SIGNIFICANT CHANGE UP (ref 17–32)
EOSINOPHIL # BLD AUTO: 0.15 K/UL — SIGNIFICANT CHANGE UP (ref 0–0.7)
EOSINOPHIL # BLD AUTO: 0.15 K/UL — SIGNIFICANT CHANGE UP (ref 0–0.7)
EOSINOPHIL NFR BLD AUTO: 2.3 % — SIGNIFICANT CHANGE UP (ref 0–8)
EOSINOPHIL NFR BLD AUTO: 2.3 % — SIGNIFICANT CHANGE UP (ref 0–8)
HCT VFR BLD CALC: 39.2 % — SIGNIFICANT CHANGE UP (ref 37–47)
HCT VFR BLD CALC: 39.2 % — SIGNIFICANT CHANGE UP (ref 37–47)
HGB BLD-MCNC: 13.3 G/DL — SIGNIFICANT CHANGE UP (ref 12–16)
HGB BLD-MCNC: 13.3 G/DL — SIGNIFICANT CHANGE UP (ref 12–16)
IMM GRANULOCYTES NFR BLD AUTO: 0.2 % — SIGNIFICANT CHANGE UP (ref 0.1–0.3)
IMM GRANULOCYTES NFR BLD AUTO: 0.2 % — SIGNIFICANT CHANGE UP (ref 0.1–0.3)
LYMPHOCYTES # BLD AUTO: 1.9 K/UL — SIGNIFICANT CHANGE UP (ref 1.2–3.4)
LYMPHOCYTES # BLD AUTO: 1.9 K/UL — SIGNIFICANT CHANGE UP (ref 1.2–3.4)
LYMPHOCYTES # BLD AUTO: 29.1 % — SIGNIFICANT CHANGE UP (ref 20.5–51.1)
LYMPHOCYTES # BLD AUTO: 29.1 % — SIGNIFICANT CHANGE UP (ref 20.5–51.1)
MCHC RBC-ENTMCNC: 30.8 PG — SIGNIFICANT CHANGE UP (ref 27–31)
MCHC RBC-ENTMCNC: 30.8 PG — SIGNIFICANT CHANGE UP (ref 27–31)
MCHC RBC-ENTMCNC: 33.9 G/DL — SIGNIFICANT CHANGE UP (ref 32–37)
MCHC RBC-ENTMCNC: 33.9 G/DL — SIGNIFICANT CHANGE UP (ref 32–37)
MCV RBC AUTO: 90.7 FL — SIGNIFICANT CHANGE UP (ref 81–99)
MCV RBC AUTO: 90.7 FL — SIGNIFICANT CHANGE UP (ref 81–99)
MONOCYTES # BLD AUTO: 0.55 K/UL — SIGNIFICANT CHANGE UP (ref 0.1–0.6)
MONOCYTES # BLD AUTO: 0.55 K/UL — SIGNIFICANT CHANGE UP (ref 0.1–0.6)
MONOCYTES NFR BLD AUTO: 8.4 % — SIGNIFICANT CHANGE UP (ref 1.7–9.3)
MONOCYTES NFR BLD AUTO: 8.4 % — SIGNIFICANT CHANGE UP (ref 1.7–9.3)
NEUTROPHILS # BLD AUTO: 3.88 K/UL — SIGNIFICANT CHANGE UP (ref 1.4–6.5)
NEUTROPHILS # BLD AUTO: 3.88 K/UL — SIGNIFICANT CHANGE UP (ref 1.4–6.5)
NEUTROPHILS NFR BLD AUTO: 59.4 % — SIGNIFICANT CHANGE UP (ref 42.2–75.2)
NEUTROPHILS NFR BLD AUTO: 59.4 % — SIGNIFICANT CHANGE UP (ref 42.2–75.2)
NRBC # BLD: 0 /100 WBCS — SIGNIFICANT CHANGE UP (ref 0–0)
NRBC # BLD: 0 /100 WBCS — SIGNIFICANT CHANGE UP (ref 0–0)
PLATELET # BLD AUTO: 167 K/UL — SIGNIFICANT CHANGE UP (ref 130–400)
PLATELET # BLD AUTO: 167 K/UL — SIGNIFICANT CHANGE UP (ref 130–400)
PMV BLD: 9.9 FL — SIGNIFICANT CHANGE UP (ref 7.4–10.4)
PMV BLD: 9.9 FL — SIGNIFICANT CHANGE UP (ref 7.4–10.4)
PROT SERPL-MCNC: 7.1 G/DL — SIGNIFICANT CHANGE UP (ref 6–8)
PROT SERPL-MCNC: 7.1 G/DL — SIGNIFICANT CHANGE UP (ref 6–8)
RBC # BLD: 4.32 M/UL — SIGNIFICANT CHANGE UP (ref 4.2–5.4)
RBC # BLD: 4.32 M/UL — SIGNIFICANT CHANGE UP (ref 4.2–5.4)
RBC # FLD: 11.9 % — SIGNIFICANT CHANGE UP (ref 11.5–14.5)
RBC # FLD: 11.9 % — SIGNIFICANT CHANGE UP (ref 11.5–14.5)
TROPONIN T SERPL-MCNC: <0.01 NG/ML — SIGNIFICANT CHANGE UP
TROPONIN T SERPL-MCNC: <0.01 NG/ML — SIGNIFICANT CHANGE UP
WBC # BLD: 6.53 K/UL — SIGNIFICANT CHANGE UP (ref 4.8–10.8)
WBC # BLD: 6.53 K/UL — SIGNIFICANT CHANGE UP (ref 4.8–10.8)
WBC # FLD AUTO: 6.53 K/UL — SIGNIFICANT CHANGE UP (ref 4.8–10.8)
WBC # FLD AUTO: 6.53 K/UL — SIGNIFICANT CHANGE UP (ref 4.8–10.8)

## 2023-11-16 PROCEDURE — 85025 COMPLETE CBC W/AUTO DIFF WBC: CPT

## 2023-11-16 PROCEDURE — 71275 CT ANGIOGRAPHY CHEST: CPT | Mod: MA

## 2023-11-16 PROCEDURE — 84484 ASSAY OF TROPONIN QUANT: CPT

## 2023-11-16 PROCEDURE — 71275 CT ANGIOGRAPHY CHEST: CPT | Mod: 26,MA

## 2023-11-16 PROCEDURE — 80053 COMPREHEN METABOLIC PANEL: CPT

## 2023-11-16 PROCEDURE — 93010 ELECTROCARDIOGRAM REPORT: CPT | Mod: 77

## 2023-11-16 PROCEDURE — 93010 ELECTROCARDIOGRAM REPORT: CPT

## 2023-11-16 PROCEDURE — 83880 ASSAY OF NATRIURETIC PEPTIDE: CPT

## 2023-11-16 PROCEDURE — 93970 EXTREMITY STUDY: CPT

## 2023-11-16 PROCEDURE — 99285 EMERGENCY DEPT VISIT HI MDM: CPT | Mod: 25

## 2023-11-16 PROCEDURE — 93970 EXTREMITY STUDY: CPT | Mod: 26

## 2023-11-16 PROCEDURE — 93005 ELECTROCARDIOGRAM TRACING: CPT

## 2023-11-16 PROCEDURE — 99285 EMERGENCY DEPT VISIT HI MDM: CPT

## 2023-11-16 RX ORDER — APIXABAN 2.5 MG/1
10 TABLET, FILM COATED ORAL ONCE
Refills: 0 | Status: COMPLETED | OUTPATIENT
Start: 2023-11-16 | End: 2023-11-16

## 2023-11-16 RX ORDER — LOSARTAN/HYDROCHLOROTHIAZIDE 100MG-25MG
1 TABLET ORAL
Refills: 0 | DISCHARGE

## 2023-11-16 RX ORDER — APIXABAN 2.5 MG/1
2 TABLET, FILM COATED ORAL
Qty: 70 | Refills: 0
Start: 2023-11-16 | End: 2023-12-13

## 2023-11-16 RX ORDER — IBUPROFEN 200 MG
1 TABLET ORAL
Refills: 0 | DISCHARGE

## 2023-11-16 RX ADMIN — APIXABAN 10 MILLIGRAM(S): 2.5 TABLET, FILM COATED ORAL at 16:24

## 2023-11-16 NOTE — ED PROVIDER NOTE - PATIENT PORTAL LINK FT
You can access the FollowMyHealth Patient Portal offered by [facility] by registering at the following website: [email or web address]. By joining [employer]’s FollowMyHealth portal, you will also be able to view your health information using other applications (apps) compatible with our system.

## 2023-11-16 NOTE — ED PROVIDER NOTE - CARE PROVIDERS DIRECT ADDRESSES
[email or web address]

## 2023-11-16 NOTE — ED PROVIDER NOTE - PHYSICAL EXAMINATION
CONSTITUTIONAL: in no apparent distress.   ENT: Hearing is intact with good acuity to spoken voice.  Patient is speaking clearly, not muffled and airway is intact.   RESPIRATORY: No signs of respiratory distress. Lung sounds are clear in all lobes bilaterally without rales, rhonchi, or wheezes.  CARDIOVASCULAR: Regular rate and rhythm.   MS: R calf with no obvious deformity, but swelling noticed; tender to palpation in r calf; full ROM; sensory function intact; distal pulse present. Rest of the upper and lower extremities unremarkable.   NEURO: A & O x 3. Normal speech. No focal deficit.  PSYCHOLOGICAL: Appropriate mood and affect. Good judgement and insight.

## 2023-11-16 NOTE — ED PROVIDER NOTE - ATTENDING APP SHARED VISIT CONTRIBUTION OF CARE
68-year-old female past medical history noted including hyperlipidemia, hypertension and recent pacemaker placed presents with family member for DVT.  Patient was with right lower extremity pain and had outpatient duplex today which revealed DVT.  Patient scented to the ED.  On exam patient in NAD, AAOx3, lungs CTA B/L, positive tenderness to right calf, no skin changes

## 2023-11-16 NOTE — ED PROVIDER NOTE - PROGRESS NOTE DETAILS
Spoke with vascular lab and was told that patient already had outpatient DVT study with positive right lower extremity DVT.  Labs unremarkable.  A CTA shows no evidence of PE.  Spoke with CT surgery team who cleared patient for Eliquis. We will send medication to pharmacy.  Patient is stable for discharge.  We will have patient follow-up with vascular surgery outpatient.

## 2023-11-16 NOTE — ED PROVIDER NOTE - CLINICAL SUMMARY MEDICAL DECISION MAKING FREE TEXT BOX
Outpatient DVT study noted.  Labs obtained.  We spoke with CT surgery who agrees for patient to start Eliquis.  Patient given dose here and will continue medication as an outpatient.  Patient will need to follow-up with vascular as an outpatient.  We will place to rapid referral.

## 2023-11-16 NOTE — ED PROVIDER NOTE - NSFOLLOWUPINSTRUCTIONS_ED_ALL_ED_FT
Please make sure to follow up with your primary care doctor in 3 days.      Deep Vein Thrombosis    WHAT YOU NEED TO KNOW:    What is a deep vein thrombosis (DVT)? A DVT is a blood clot that forms in a deep vein of the body. The deep veins in the legs, thighs, and hips are the most common sites for DVT. A DVT can also occur in a deep vein within your arms. The clot prevents the normal flow of blood in the vein. The blood backs up and causes pain and swelling. The DVT can break into smaller pieces and travel to your lungs and cause a blockage called a pulmonary embolism (PE). A PE can become life-threatening.  Thrombus and Embolus         What increases my risk for a DVT? After you have a DVT, your risk for another increases. Anyone can get a DVT, but any of the following increases your risk:   •A family history of blood clots      •Limited activity caused by bed rest, a leg cast, or sitting for long periods      •Injury to a deep vein, or surgery      •A blood disorder that makes your blood clot faster than normal, such as factor V Leiden mutation      •Age older than 60 years      •Hormone replacement therapy      •Birth control pills, especially in women who smoke or are older than 35 years      •Pregnancy, and for 6 weeks after childbirth      •Cancer or heart failure      •A catheter placed in a large vein      •Smoking cigarettes      •Obesity or varicose veins      What are the signs and symptoms of a DVT?   •Swelling      •Redness      •Warmth, pain, or tenderness    DVT Signs and Symptoms         How is a DVT diagnosed?   •A D-dimer blood test may be done to check for signs of a blood clot.      •An ultrasound uses sound waves to show pictures on a monitor. An ultrasound may be done to show a clot in your vein.      •Contrast venography is an x-ray of a vein. Contrast liquid is used to make the vein easier to see on the x-ray. Tell a healthcare provider if you have ever had an allergic reaction to contrast liquid.      How is a DVT treated?   •Blood thinners help prevent blood clots. Clots can cause strokes, heart attacks, and death. The following are general safety guidelines to follow while you are taking a blood thinner:?Watch for bleeding and bruising while you take blood thinners. Watch for bleeding from your gums or nose. Watch for blood in your urine and bowel movements. Use a soft washcloth on your skin, and a soft toothbrush to brush your teeth. This can keep your skin and gums from bleeding. If you shave, use an electric shaver. Do not play contact sports.       ?Tell your dentist and other healthcare providers that you take a blood thinner. Wear a bracelet or necklace that says you take this medicine.       ?Do not start or stop any other medicines unless your healthcare provider tells you to. Many medicines cannot be used with blood thinners.      ?Take your blood thinner exactly as prescribed by your healthcare provider. Do not skip does or take less than prescribed. Tell your provider right away if you forget to take your blood thinner, or if you take too much.      ?Warfarin is a blood thinner that you may need to take. The following are things you should be aware of if you take warfarin: ?Foods and medicines can affect the amount of warfarin in your blood. Do not make major changes to your diet while you take warfarin. Warfarin works best when you eat about the same amount of vitamin K every day. Vitamin K is found in green leafy vegetables and certain other foods. Ask for more information about what to eat when you are taking warfarin.      ?You will need to see your healthcare provider for follow-up visits when you are on warfarin. You will need regular blood tests. These tests are used to decide how much medicine you need.         •Clot busters are emergency medicines that work to dissolve blood clots.      •A vena cava filter may be placed inside your vena cava to treat your DVT. The vena cava is a large vein that brings blood from your lower body up to your heart. The filter may help trap pieces of a blood clot and prevent them from going into your lungs.      •Surgery called a thrombectomy may be done to remove the clot. A procedure called thrombolysis may instead be done to inject a clot buster that helps break the clot apart.      What can I do to manage a DVT?   •Wear pressure stockings as directed. The stockings put pressure on your legs. This improves blood flow and helps prevent clots. Wear the stockings during the day. Do not wear them when you sleep.  Pressure Stockings            •Elevate your legs above the level of your heart. Elevate your legs when you sit or lie down, as often as you can. This will help decrease swelling and pain. Prop your legs on pillows or blankets to keep them elevated comfortably.  Elevate Leg           What can I do to prevent a DVT?   •Exercise regularly to help increase your blood flow. Walking is a good low-impact exercise. Talk to your healthcare provider about the best exercise plan for you.  Black Family Walking for Exercise           •Change your body position or move around often. Move and stretch in your seat several times each hour if you travel by car or work at a desk. In an airplane, get up and walk every hour. Move your legs by tightening and releasing your leg muscles while sitting. You can move your legs while sitting by raising and lowering your heels. Keep your toes on the floor while you do this. You can also raise and lower your toes while keeping your heels on the floor.  DVT Prevention Heel Raise       DVT Prevention Toe Raise           •Maintain a healthy weight. Ask your healthcare provider what a healthy weight is for you. Ask him or her to help you create a weight loss plan if you are overweight.      •Do not smoke. Nicotine and other chemicals in cigarettes and cigars can damage blood vessels and make it more difficult to manage your DVT. Ask your healthcare provider for information if you currently smoke and need help to quit. E-cigarettes or smokeless tobacco still contain nicotine. Talk to your healthcare provider before you use these products.      •Ask about birth control if you are a woman who takes the pill. A birth control pill increases the risk for PE in certain women. The risk is higher if you are also older than 35, smoke cigarettes, or have a blood clotting disorder. Talk to your healthcare provider about other ways to prevent pregnancy, such as a cervical cap or intrauterine device (IUD).      Call your local emergency number (911 in the [place]) if:   •You feel lightheaded, short of breath, and have chest pain.      •You cough up blood.      When should I seek immediate care?   •Your arm or leg feels warm, tender, and painful. It may look swollen and red.          When should I call my doctor?   •You have questions or concerns about your condition or care.          CARE AGREEMENT:    You have the right to help plan your care. Learn about your health condition and how it may be treated. Discuss treatment options with your healthcare providers to decide what care you want to receive. You always have the right to refuse treatment.

## 2023-11-16 NOTE — ED ADULT NURSE NOTE - NSFALLHARMRISKINTERV_ED_ALL_ED

## 2023-11-16 NOTE — ED PROVIDER NOTE - OBJECTIVE STATEMENT
68-year-old female with past medical history of hyperlipidemia and hypertension who presented to ED to rule out DVT.  Reports that she had pacemaker placement done a few days ago and has been having right lower extremity pain since she was discharged from the hospital. Reports that pain is constant and worse with walking.  Denies recent trauma or injury to the leg, fever, history of gout, and appear discomfort or injury elsewhere. 68-year-old female with past medical history of hyperlipidemia and hypertension who presented to ED to rule out DVT.  Reports that she had pacemaker placement done a few days ago and has been having right lower extremity pain since she was discharged from the hospital. Reports that pain is constant and worse with walking. Pt was sent to US by her CT surgeon and was told that she has RLE DVT. Denies recent trauma or injury to the leg, fever, history of gout, and appear discomfort or injury elsewhere.

## 2023-11-16 NOTE — ED PROVIDER NOTE - CARE PROVIDER_API CALL
Ross Blancas  Vascular Surgery  [address] [place] [address], [address]  [place] [address]  Phone: [phone]  Fax: [phone]  Follow Up Time: 1-3 Days

## 2023-11-17 DIAGNOSIS — Z95.0 PRESENCE OF CARDIAC PACEMAKER: ICD-10-CM

## 2023-12-11 ENCOUNTER — APPOINTMENT (OUTPATIENT)
Dept: ELECTROPHYSIOLOGY | Facility: CLINIC | Age: 68
End: 2023-12-11

## 2024-02-12 ENCOUNTER — APPOINTMENT (OUTPATIENT)
Dept: ELECTROPHYSIOLOGY | Facility: CLINIC | Age: 69
End: 2024-02-12

## 2024-02-12 ENCOUNTER — APPOINTMENT (OUTPATIENT)
Dept: ELECTROPHYSIOLOGY | Facility: CLINIC | Age: 69
End: 2024-02-12
Payer: COMMERCIAL

## 2024-02-12 VITALS
DIASTOLIC BLOOD PRESSURE: 85 MMHG | HEART RATE: 60 BPM | WEIGHT: 162 LBS | TEMPERATURE: 97.9 F | HEIGHT: 61 IN | RESPIRATION RATE: 17 BRPM | SYSTOLIC BLOOD PRESSURE: 134 MMHG | BODY MASS INDEX: 30.58 KG/M2

## 2024-02-12 DIAGNOSIS — Z78.9 OTHER SPECIFIED HEALTH STATUS: ICD-10-CM

## 2024-02-12 DIAGNOSIS — I10 ESSENTIAL (PRIMARY) HYPERTENSION: ICD-10-CM

## 2024-02-12 DIAGNOSIS — E78.5 HYPERLIPIDEMIA, UNSPECIFIED: ICD-10-CM

## 2024-02-12 DIAGNOSIS — I82.431 ACUTE EMBOLISM AND THROMBOSIS OF RIGHT POPLITEAL VEIN: ICD-10-CM

## 2024-02-12 DIAGNOSIS — Z95.0 PRESENCE OF CARDIAC PACEMAKER: ICD-10-CM

## 2024-02-12 DIAGNOSIS — I49.5 SICK SINUS SYNDROME: ICD-10-CM

## 2024-02-12 PROCEDURE — 93000 ELECTROCARDIOGRAM COMPLETE: CPT | Mod: 59

## 2024-02-12 PROCEDURE — 93280 PM DEVICE PROGR EVAL DUAL: CPT

## 2024-02-12 PROCEDURE — 99214 OFFICE O/P EST MOD 30 MIN: CPT | Mod: 25

## 2024-02-12 RX ORDER — MELOXICAM 15 MG/1
15 TABLET ORAL
Refills: 0 | Status: ACTIVE | COMMUNITY

## 2024-02-12 RX ORDER — AMLODIPINE BESYLATE 5 MG/1
5 TABLET ORAL
Refills: 0 | Status: ACTIVE | COMMUNITY

## 2024-02-12 RX ORDER — IBUPROFEN 600 MG/1
600 TABLET, FILM COATED ORAL
Refills: 0 | Status: COMPLETED | COMMUNITY
End: 2024-02-12

## 2024-02-12 RX ORDER — LOSARTAN POTASSIUM AND HYDROCHLOROTHIAZIDE 12.5; 5 MG/1; MG/1
50-12.5 TABLET ORAL DAILY
Refills: 0 | Status: ACTIVE | COMMUNITY

## 2024-02-13 PROBLEM — I82.431 ACUTE DEEP VEIN THROMBOSIS (DVT) OF POPLITEAL VEIN OF RIGHT LOWER EXTREMITY: Status: ACTIVE | Noted: 2024-02-13

## 2024-02-13 PROBLEM — Z95.0 S/P PLACEMENT OF CARDIAC PACEMAKER: Status: ACTIVE | Noted: 2023-11-15

## 2024-02-13 RX ORDER — ATORVASTATIN CALCIUM 20 MG/1
20 TABLET, FILM COATED ORAL DAILY
Qty: 90 | Refills: 3 | Status: ACTIVE | COMMUNITY

## 2024-02-13 RX ORDER — APIXABAN 5 MG/1
5 TABLET, FILM COATED ORAL
Qty: 120 | Refills: 0 | Status: ACTIVE | COMMUNITY

## 2024-02-13 NOTE — END OF VISIT
[Time Spent: ___ minutes] : I have spent [unfilled] minutes of time on the encounter. [FreeTextEntry3] : I, Andrae Garcia, personally performed the services described in this documentation. All medical record entries made by the scribe/nurse CTA were at my direction and in my presence. I have reviewed the chart and agree that the record reflects my personal performance and is accurate and complete.

## 2024-02-13 NOTE — HISTORY OF PRESENT ILLNESS
[FreeTextEntry1] : Hypertension, hyperlipidemia, sick sinus syndrome s/p dual-chamber pacemaker implant ([employer] on 11/10/2023), acute right popliteal DVT of 11/16/2023.   Patient was hospitalized at [facility] in November 2023 for symptomatic bradycardia. Her heart rate was at 30 bpm with severe symptoms. She was started on Dopamine drip and monitored for 48 hours. Patient underwent dual-chamber pacemaker implant on 11/10/2023 due to sick sinus syndrome in the absence of reversible causes.     Patient presented to ER on 11/16/2023 for acute DVT and was started on Eliquis for treatment of DVT.     Patient has no chest pain, no shortness of breath at rest, no dyspnea on exertion, and no syncope. She presents for evaluation.

## 2024-02-13 NOTE — REASON FOR VISIT
[Arrhythmia/ECG Abnorrmalities] : arrhythmia/ECG abnormalities [Other: ______] : provided by [employer] [FreeTextEntry3] : Dr. Bryon Garcia  [Interpreters_IDNumber] : [id number] [Interpreters_FullName] : [place]

## 2024-02-13 NOTE — CARDIOLOGY SUMMARY
[de-identified] :  (02/12/2024): ECG. Atrially paced rhythm at 60 bpm, nonspecific ST/T wave abnormalities.   [de-identified] : (11/09/2023): 2D echo. EF 68%. Moderate concentric LVH. Mild TR.    [de-identified] :  (11/10/2023): Dual pacemaker implant. [employer].     [de-identified] : (11/17/2023) LE doppler: DVT in the right popliteal and peroneal vein. Left lower extremity negative for DVT.

## 2024-02-13 NOTE — DISCUSSION/SUMMARY
[EKG obtained to assist in diagnosis and management of assessed problem(s)] : EKG obtained to assist in diagnosis and management of assessed problem(s) [FreeTextEntry1] : Ms. Lesly Perez is a pleasant 68-year-old woman, Mandarin speaking, with hypertension, hyperlipidemia, sick sinus syndrome, s/p dual-chamber pacemaker implant on 11/10/2023, [place], s/p acute lower extremity DVT on 11/16/2023.     Patient is on Eliquis for the treatment of DVT. I recommend to continue Eliquis and follow-up with PCP.     I recommend to continue the same medication including Amlodipine, Atorvastatin, and Losartan.  I interrogated patient's pacemaker and reprogrammed outputs to threshold.    Thresholds: Atrium 0.5 volts at 0.4 milliseconds ; Threshold V. 0.75 volts at 0.4 milliseconds.   Impedance atrium 440 ohms, impedance  ohms.    Sensing atrium 1.0 millivolt. Ventricle greater than 12.0 millivolts.   Battery life 9.8-11.1 years.   Pacing 60% atrium, ventricle less than 1%.   No arrhythmias except for PMT.    I reprogrammed PVARP from 300 ms to 350 milliseconds and I decreased base rate from 60 to 55 bpm.   Patient had multiple PMT episodes with VA timing at 330 milliseconds during PMT. Device appropriately terminated PMT. By extending PVARP from 300 to 350, that should prevent this.      I interrogated and reprogrammed her device as described in procedure. Her wound is healed properly, with no signs of inflammation, infection or bleeding. I discussed with patient plan of care in great details. I discussed remote monitoring with her, and need to call office if she does manual transmission I answered all her questions to her satisfaction. Patient was pleased with the visit.    Patient will follow with me in 6 months time. Please do not hesitate to contact me at [phone] if you have any further questions regarding this patient care.

## 2024-02-13 NOTE — ADDENDUM
[FreeTextEntry1] : Isa JULES assisted in documentation on 02/13/2024   acting as a scribe for Dr. Andrae Garcia.

## 2024-04-30 NOTE — PATIENT PROFILE ADULT - IS THERE A SUSPICION OF ABUSE/NEGLIGENCE?
fed by clinician used right hand to hold cup/cup/self fed spoon/fed by clinician cup/spoon/self fed no

## 2024-05-13 ENCOUNTER — NON-APPOINTMENT (OUTPATIENT)
Age: 69
End: 2024-05-13

## 2024-05-14 ENCOUNTER — APPOINTMENT (OUTPATIENT)
Dept: CARDIOLOGY | Facility: CLINIC | Age: 69
End: 2024-05-14
Payer: MEDICARE

## 2024-05-14 PROCEDURE — 93296 REM INTERROG EVL PM/IDS: CPT

## 2024-05-14 PROCEDURE — 93294 REM INTERROG EVL PM/LDLS PM: CPT

## 2024-08-13 ENCOUNTER — APPOINTMENT (OUTPATIENT)
Dept: CARDIOLOGY | Facility: CLINIC | Age: 69
End: 2024-08-13

## 2024-08-14 ENCOUNTER — NON-APPOINTMENT (OUTPATIENT)
Age: 69
End: 2024-08-14

## 2024-08-14 ENCOUNTER — APPOINTMENT (OUTPATIENT)
Dept: CARDIOLOGY | Facility: CLINIC | Age: 69
End: 2024-08-14
Payer: MEDICARE

## 2024-08-14 PROCEDURE — 93294 REM INTERROG EVL PM/LDLS PM: CPT

## 2024-08-14 PROCEDURE — 93296 REM INTERROG EVL PM/IDS: CPT

## 2024-09-16 ENCOUNTER — APPOINTMENT (OUTPATIENT)
Dept: ELECTROPHYSIOLOGY | Facility: CLINIC | Age: 69
End: 2024-09-16
Payer: MEDICARE

## 2024-09-16 VITALS
RESPIRATION RATE: 17 BRPM | BODY MASS INDEX: 30.58 KG/M2 | TEMPERATURE: 97.6 F | SYSTOLIC BLOOD PRESSURE: 122 MMHG | WEIGHT: 162 LBS | HEIGHT: 61 IN | HEART RATE: 58 BPM | DIASTOLIC BLOOD PRESSURE: 73 MMHG

## 2024-09-16 DIAGNOSIS — I10 ESSENTIAL (PRIMARY) HYPERTENSION: ICD-10-CM

## 2024-09-16 DIAGNOSIS — Z45.018 ENCOUNTER FOR ADJUSTMENT AND MANAGEMENT OF OTHER PART OF CARDIAC PACEMAKER: ICD-10-CM

## 2024-09-16 DIAGNOSIS — I49.5 SICK SINUS SYNDROME: ICD-10-CM

## 2024-09-16 PROCEDURE — 99214 OFFICE O/P EST MOD 30 MIN: CPT

## 2024-09-16 PROCEDURE — 93280 PM DEVICE PROGR EVAL DUAL: CPT

## 2024-09-16 RX ORDER — GABAPENTIN 100 MG/1
100 CAPSULE ORAL
Refills: 0 | Status: ACTIVE | COMMUNITY
Start: 2024-09-16

## 2024-09-16 RX ORDER — ROSUVASTATIN CALCIUM 20 MG/1
20 TABLET, FILM COATED ORAL
Refills: 0 | Status: ACTIVE | COMMUNITY
Start: 2024-09-16

## 2024-09-16 NOTE — REASON FOR VISIT
[Other: ______] : provided by [employer] [Family Member] : family member [FreeTextEntry3] : Dr. Bryon Garcia  [Interpreters_IDNumber] : [id number]

## 2024-09-16 NOTE — PROCEDURE
[No] : not [Pacemaker] : pacemaker [DDD] : DDD [Normal] : The battery status is normal. [Lead Imp:  ___ohms] : lead impedance was [unfilled] ohms [Sensing Amplitude ___mv] : sensing amplitude was [unfilled] mv [___V @] : [unfilled] V [___ ms] : [unfilled] ms [Auto Capture "On"] : auto capture was switched "On" [de-identified] : [employer] [de-identified] : [employer] MRI [de-identified] : [id number] [de-identified] : 11/10/2023 [de-identified] : 10.4 - 11.3 Years [de-identified] : AP 19%  <1% 3 PMT No events

## 2024-09-16 NOTE — CARDIOLOGY SUMMARY
[de-identified] :  (02/12/2024): ECG. Atrially paced rhythm at 60 bpm, nonspecific ST/T wave abnormalities.   [de-identified] : (11/09/2023): 2D echo. EF 68%. Moderate concentric LVH. Mild TR.    [de-identified] :  (11/10/2023): Dual pacemaker implant. [employer].     [de-identified] : (11/17/2023) LE doppler: DVT in the right popliteal and peroneal vein. Left lower extremity negative for DVT.

## 2024-09-16 NOTE — HISTORY OF PRESENT ILLNESS
[FreeTextEntry1] : Hypertension, hyperlipidemia, sick sinus syndrome s/p dual-chamber pacemaker implant ([facility] on 11/10/2023), acute right popliteal DVT of 11/16/2023.   Patient was hospitalized at [facility] in November 2023 for symptomatic bradycardia. Her heart rate was at 30 bpm with severe symptoms. She was started on Dopamine drip and monitored for 48 hours. Patient underwent dual-chamber pacemaker implant on 11/10/2023 due to sick sinus syndrome in the absence of reversible causes.     Patient presented to ER on 11/16/2023 for acute DVT and was started on Eliquis for treatment of DVT.     She presents today for routine device check. Accompanied by her Son (Son speaks/understands English).  Patient has no chest pain, no shortness of breath at rest, no dyspnea on exertion, and no syncope. Her only complaint is upper back pain. She presents for evaluation.

## 2024-09-16 NOTE — PHYSICAL EXAM
[Well Developed] : well developed [Well Nourished] : well nourished [No Acute Distress] : no acute distress [Normal Conjunctiva] : normal conjunctiva [Normal Venous Pressure] : normal venous pressure [No Carotid Bruit] : no carotid bruit [Normal S1, S2] : normal S1, S2 [No Murmur] : no murmur [No Rub] : no rub [No Gallop] : no gallop [Clear Lung Fields] : clear lung fields [Good Air Entry] : good air entry [No Respiratory Distress] : no respiratory distress  [Soft] : abdomen soft [Non Tender] : non-tender [No Masses/organomegaly] : no masses/organomegaly [Normal Bowel Sounds] : normal bowel sounds [Normal Gait] : normal gait [No Edema] : no edema [No Cyanosis] : no cyanosis [No Clubbing] : no clubbing [No Varicosities] : no varicosities [No Rash] : no rash [No Skin Lesions] : no skin lesions [Moves all extremities] : moves all extremities [No Focal Deficits] : no focal deficits [Normal Speech] : normal speech [Alert and Oriented] : alert and oriented [Normal memory] : normal memory [General Appearance - Well Developed] : well developed [Normal Appearance] : normal appearance [Well Groomed] : well groomed [General Appearance - Well Nourished] : well nourished [No Deformities] : no deformities [General Appearance - In No Acute Distress] : no acute distress [Heart Rate And Rhythm] : heart rate and rhythm were normal [] : no respiratory distress [Left Infraclavicular] : left infraclavicular area [Clean] : clean [Dry] : dry [Well-Healed] : well-healed

## 2024-09-16 NOTE — DISCUSSION/SUMMARY
[FreeTextEntry1] : Ms. Lesly Perez is a pleasant 69-year-old woman, Mandarin speaking, with hypertension, hyperlipidemia, sick sinus syndrome, s/p dual-chamber pacemaker implant on 11/10/2023([facility]), s/p acute lower extremity DVT on 11/16/2023.     Patient is on Eliquis for the treatment of DVT. I recommend to continue Eliquis and follow-up with PCP.     I recommend to continue the same medication including Amlodipine, Atorvastatin, and Losartan.  - Device interrogated and reprogrammed as described in procedure. Device function normal. All parameters stable. No events. See Device Printout.  Patient is enrolled in [facility], however she is not transmitting. Pt traveled to [place] for 1 month this past summer so this could have caused the disconnect. Pt's son will help troubleshoot when they get home.  I interrogated and reprogrammed her device as described in procedure. Her wound is healed properly, with no signs of inflammation, infection or bleeding. I discussed with patient plan of care in great details. I discussed remote monitoring with her, and need to call office if she does manual transmission I answered all her questions to her satisfaction. Patient was pleased with the visit.    Patient will follow with me in 7-9 months' time. Please do not hesitate to contact me at [phone] if you have any further questions regarding this patient care.

## 2024-12-16 ENCOUNTER — APPOINTMENT (OUTPATIENT)
Dept: CARDIOLOGY | Facility: CLINIC | Age: 69
End: 2024-12-16
Payer: MEDICARE

## 2024-12-16 ENCOUNTER — NON-APPOINTMENT (OUTPATIENT)
Age: 69
End: 2024-12-16

## 2024-12-16 PROCEDURE — 93294 REM INTERROG EVL PM/LDLS PM: CPT

## 2024-12-16 PROCEDURE — 93296 REM INTERROG EVL PM/IDS: CPT

## 2025-03-18 ENCOUNTER — NON-APPOINTMENT (OUTPATIENT)
Age: 70
End: 2025-03-18

## 2025-03-18 ENCOUNTER — APPOINTMENT (OUTPATIENT)
Dept: CARDIOLOGY | Facility: CLINIC | Age: 70
End: 2025-03-18
Payer: MEDICARE

## 2025-03-18 PROCEDURE — 93294 REM INTERROG EVL PM/LDLS PM: CPT

## 2025-03-18 PROCEDURE — 93296 REM INTERROG EVL PM/IDS: CPT

## 2025-06-09 ENCOUNTER — NON-APPOINTMENT (OUTPATIENT)
Age: 70
End: 2025-06-09

## 2025-06-09 ENCOUNTER — APPOINTMENT (OUTPATIENT)
Dept: ELECTROPHYSIOLOGY | Facility: CLINIC | Age: 70
End: 2025-06-09
Payer: MEDICARE

## 2025-06-09 VITALS
HEART RATE: 57 BPM | SYSTOLIC BLOOD PRESSURE: 124 MMHG | WEIGHT: 170 LBS | BODY MASS INDEX: 34.27 KG/M2 | DIASTOLIC BLOOD PRESSURE: 80 MMHG | HEIGHT: 59 IN

## 2025-06-09 PROCEDURE — 93280 PM DEVICE PROGR EVAL DUAL: CPT

## 2025-06-09 PROCEDURE — 99214 OFFICE O/P EST MOD 30 MIN: CPT

## 2025-06-11 RX ORDER — CHOLECALCIFEROL (VITAMIN D3) 125 MCG
TABLET ORAL
Refills: 0 | Status: ACTIVE | COMMUNITY

## 2025-06-11 RX ORDER — CALCIUM CITRATE/VITAMIN D3 315MG-6.25
TABLET ORAL
Refills: 0 | Status: ACTIVE | COMMUNITY

## 2025-06-11 RX ORDER — DOCUSATE SODIUM 100 MG/1
100 CAPSULE, LIQUID FILLED ORAL 3 TIMES DAILY
Refills: 0 | Status: ACTIVE | COMMUNITY

## 2025-06-11 RX ORDER — ACETAMINOPHEN 325 MG/1
325 TABLET ORAL EVERY 8 HOURS
Refills: 0 | Status: ACTIVE | COMMUNITY

## 2025-06-11 RX ORDER — CYANOCOBALAMIN (VITAMIN B-12) 100 MCG
100 TABLET ORAL
Refills: 0 | Status: ACTIVE | COMMUNITY

## 2025-07-02 ENCOUNTER — APPOINTMENT (OUTPATIENT)
Dept: CARDIOLOGY | Facility: CLINIC | Age: 70
End: 2025-07-02
Payer: MEDICARE

## 2025-07-02 VITALS
HEART RATE: 57 BPM | SYSTOLIC BLOOD PRESSURE: 112 MMHG | HEIGHT: 59 IN | BODY MASS INDEX: 35.08 KG/M2 | WEIGHT: 174 LBS | DIASTOLIC BLOOD PRESSURE: 76 MMHG

## 2025-07-02 PROBLEM — I82.409 DVT (DEEP VENOUS THROMBOSIS): Status: ACTIVE | Noted: 2025-07-02

## 2025-07-02 PROCEDURE — 93000 ELECTROCARDIOGRAM COMPLETE: CPT | Mod: NC

## 2025-07-02 PROCEDURE — 99214 OFFICE O/P EST MOD 30 MIN: CPT | Mod: 25

## 2025-07-02 RX ORDER — DONEPEZIL HYDROCHLORIDE 5 MG/1
5 TABLET ORAL
Refills: 0 | Status: ACTIVE | COMMUNITY

## 2025-09-09 ENCOUNTER — NON-APPOINTMENT (OUTPATIENT)
Age: 70
End: 2025-09-09

## 2025-09-09 ENCOUNTER — APPOINTMENT (OUTPATIENT)
Dept: CARDIOLOGY | Facility: CLINIC | Age: 70
End: 2025-09-09
Payer: MEDICARE

## 2025-09-09 PROCEDURE — 93294 REM INTERROG EVL PM/LDLS PM: CPT

## 2025-09-09 PROCEDURE — 93296 REM INTERROG EVL PM/IDS: CPT
